# Patient Record
Sex: MALE | Race: WHITE
[De-identification: names, ages, dates, MRNs, and addresses within clinical notes are randomized per-mention and may not be internally consistent; named-entity substitution may affect disease eponyms.]

---

## 2017-06-28 ENCOUNTER — HOSPITAL ENCOUNTER (OUTPATIENT)
Dept: HOSPITAL 45 - C.PET | Age: 82
Discharge: HOME | End: 2017-06-28
Attending: PHYSICIAN ASSISTANT
Payer: OTHER GOVERNMENT

## 2017-06-28 ENCOUNTER — HOSPITAL ENCOUNTER (OUTPATIENT)
Dept: HOSPITAL 45 - C.CTS | Age: 82
Discharge: HOME | End: 2017-06-28
Attending: PHYSICIAN ASSISTANT
Payer: OTHER GOVERNMENT

## 2017-06-28 DIAGNOSIS — R91.8: ICD-10-CM

## 2017-06-28 DIAGNOSIS — K76.9: ICD-10-CM

## 2017-06-28 DIAGNOSIS — R91.1: ICD-10-CM

## 2017-06-28 DIAGNOSIS — R06.02: Primary | ICD-10-CM

## 2017-06-28 DIAGNOSIS — R91.1: Primary | ICD-10-CM

## 2017-06-28 NOTE — DIAGNOSTIC IMAGING REPORT
CT OF THE CHEST WITHOUT IV CONTRAST



CLINICAL HISTORY: Lung mass. Shortness of breath.    



COMPARISON STUDY:  CT of the abdomen and pelvis April 18, 2017. 



CT DOSE: 667.69 mGy.cm



TECHNIQUE:  Axial images of the chest were obtained without IV contrast.  Images

were reviewed in the axial, sagittal, and coronal planes. IV contrast was not

administered for this examination.



FINDINGS:  No enlarged axillary, mediastinal or hilar lymph nodes are present.

The size of the heart is normal. There is no pericardial effusion. Note is made

of a lobulated 4 x 3.5 cm left lower lobe mass with several pleural tags. There

is also an indeterminate 1 cm circumscribed right upper lobe nodule on image 81

of 301. A few smaller right upper lobe nodules measure up to 4 mm. These are

indeterminate. No suspicious osseous lesions are present. There is no

consolidation. No pneumothorax or pleural effusion is present. Visualized

portions of the upper abdomen demonstrate a subtle 2.6 cm hepatic dome hypodense

lesion shown to likely reflect a hemangioma on abdominal CT of April 18, 2017.



IMPRESSION:  



1. 4 x 3.5 cm lobulated left lower lobe mass which has slightly increased in

size since CT of April 18, 2017. This is consistent with bronchogenic carcinoma.



2. No thoracic lymphadenopathy.



3. Several indeterminate right upper lobe nodules, the largest of which is a 1

cm circumscribed nodule. Evaluation with PET/CT may be of benefit.



4. 2.6 cm hepatic dome lesion which was shown to represent a hemangioma on CT of

April 18, 2017.







Electronically signed by:  Silver Gordon M.D.

6/28/2017 8:05 AM



Dictated Date/Time:  6/28/2017 7:50 AM

## 2017-06-29 NOTE — DIAGNOSTIC IMAGING REPORT
PET/CT



CLINICAL HISTORY: Pulmonary nodule.    



TECHNIQUE:  A PET/CT was performed from the skull base through the upper thighs

following intravenous injection of F 18 FDG IV. The injection was performed at

12:15 PM on June 28, 2017 and imaging began at 1:00 PM on June 28, 2017.

Unenhanced CT was performed for attenuation correction purposes and anatomic

localization.



COMPARISON STUDY:  CT of the abdomen and pelvis April 18, 2017 and chest CT

performed earlier today.



FINDINGS: 



Head and neck: No suspicious FDG uptake is identified within the neck. There is

no cervical lymphadenopathy.



Chest: There is marked FDG uptake within the lobulated 4 x 3.5 cm left lower

lobe mass. The SUV max for this mass is 8.4. There is moderate FDG uptake within

the circumscribed 1 cm right upper lobe nodule which is shown on image 65. The

SUV max for this nodule is 4.4. A few additional smaller right upper lobe

nodules are indeterminate and too small to evaluate by PET imaging. No

suspicious josé FDG uptake is identified within the chest.



Abdomen and Pelvis: No suspicious FDG uptake is identified within the abdomen or

the pelvis. There is no abdominal or pelvic lymphadenopathy. Prostate is

moderately enlarged.



Musculoskeletal: No suspicious skeletal FDG uptake is identified.



IMPRESSION:  



1. Marked FDG uptake within the 4 x 3.5 cm left lower lobe mass consistent with

bronchogenic carcinoma.



2. Moderate FDG uptake within a circumscribed 1 cm right upper lobe nodule. This

nodule is indeterminate although suspicious for a neoplastic process given the

degree of FDG uptake. This could reflect a metastasis or primary pulmonary

lesion.



3. No evidence of josé involvement.







Electronically signed by:  Silver Gordon M.D.

6/29/2017 5:22 PM



Dictated Date/Time:  6/28/2017 2:14 PM

## 2017-07-26 ENCOUNTER — HOSPITAL ENCOUNTER (OUTPATIENT)
Dept: HOSPITAL 45 - C.ACU | Age: 82
Discharge: HOME | End: 2017-07-26
Attending: INTERNAL MEDICINE
Payer: OTHER GOVERNMENT

## 2017-07-26 VITALS
OXYGEN SATURATION: 95 % | HEART RATE: 71 BPM | TEMPERATURE: 97.34 F | DIASTOLIC BLOOD PRESSURE: 74 MMHG | SYSTOLIC BLOOD PRESSURE: 141 MMHG

## 2017-07-26 VITALS
WEIGHT: 224.87 LBS | WEIGHT: 224.87 LBS | HEIGHT: 70.98 IN | HEIGHT: 70.98 IN | BODY MASS INDEX: 31.48 KG/M2 | BODY MASS INDEX: 31.48 KG/M2 | BODY MASS INDEX: 31.48 KG/M2

## 2017-07-26 VITALS
DIASTOLIC BLOOD PRESSURE: 68 MMHG | HEART RATE: 76 BPM | SYSTOLIC BLOOD PRESSURE: 118 MMHG | OXYGEN SATURATION: 94 % | TEMPERATURE: 97.34 F

## 2017-07-26 VITALS
TEMPERATURE: 97.88 F | DIASTOLIC BLOOD PRESSURE: 63 MMHG | OXYGEN SATURATION: 94 % | SYSTOLIC BLOOD PRESSURE: 125 MMHG | HEART RATE: 91 BPM

## 2017-07-26 VITALS — OXYGEN SATURATION: 93 % | DIASTOLIC BLOOD PRESSURE: 70 MMHG | HEART RATE: 69 BPM | SYSTOLIC BLOOD PRESSURE: 128 MMHG

## 2017-07-26 VITALS
OXYGEN SATURATION: 94 % | TEMPERATURE: 97.88 F | SYSTOLIC BLOOD PRESSURE: 131 MMHG | HEART RATE: 68 BPM | DIASTOLIC BLOOD PRESSURE: 63 MMHG

## 2017-07-26 DIAGNOSIS — Z80.1: ICD-10-CM

## 2017-07-26 DIAGNOSIS — R91.1: Primary | ICD-10-CM

## 2017-07-26 DIAGNOSIS — Z87.891: ICD-10-CM

## 2017-07-26 DIAGNOSIS — E66.9: ICD-10-CM

## 2017-07-26 DIAGNOSIS — Z82.49: ICD-10-CM

## 2017-07-26 NOTE — DIAGNOSTIC IMAGING REPORT
SINGLE VIEW CHEST



CLINICAL HISTORY:  Status post navigational bronchoscopy.



FINDINGS: An AP, portable, upright chest radiograph is correlated with chest CT

dated 6/28/2017. The examination is degraded by portable technique and patient

rotation.  The heart is mildly enlarged. The mediastinal contour is within

normal limits. Interstitial thickening and nodularity are similar to previous. A

left basilar opacity likely corresponds to the mass lesion seen on the 6/28/2017

CT scan. Linear atelectasis is present the left lung base. No pleural effusion

or pneumothorax is seen. The skeletal structures are osteopenic. The bony thorax

is grossly intact.



IMPRESSION:



1. No pneumothorax is identified post procedure.



2. A left basilar opacity corresponds to the mass lesion seen by CT on

6/28/2017.



3. Smaller right-sided pulmonary nodules seen on the prior CT scan were not well

visualized by x-ray. 



4. Mild cardiomegaly.







Electronically signed by:  Bacilio Zapata M.D.

7/26/2017 1:55 PM



Dictated Date/Time:  7/26/2017 1:52 PM

## 2017-07-26 NOTE — HISTORY AND PHYSICAL
History & Physical


Date


Jul 26, 2017.





Chief Complaint


CC: Lung Mass





History of Present Illness


The patient is a 81 year old male with complaints of Lung Mass








This patient is an [id number] 81-year-old gentleman with pulmonary 

mass with a high risk for primary lung carcinoma from his history of tobacco 

exposure as well as asbestos. At this time he has PET avid left lower lobe 

nodule at greatest dimension of 4 centimeters with an SUV of 8.4. He also has 

complicating the matter a 1.1 centimeter right upper lobe nodule with minimal 

PET avidity. The mediastinum showed no signs of PET avidity via the PET scan. 

The patient will require EBUS/ENB evaluation for further workup. The patient 

and his daughter both agree this is in the best interest of the patient.





CT Non-contrast Chest (6/28/17)


    RUL multiple nodules largest 11cm


   LLL 4.3 x 3.5cm mass


   2.6cm hepatic lesion possible hemandioma


PET/CT (6/28/17)


   LLL 4 x 3.5cm mass SUV: 8.4


   RUL 1cm SUV: 4.4





Additional History


Hepatic Disease:  No


Endocrine Disorder:  No


Kidney Disease:  No


Hypertension:  No


Heart Disease:  No


Bleeding Tendencies:  No


Infectious Diseases:  No





Allergies


Coded Allergies:  


     No Known Allergies (Unverified , 7/26/17)





Home Medications


Scheduled


Dutasteride (Avodart), 0.5 MG PO QAM


Lisinopril (Zestril), 40 MG PO QAM


Simvastatin (Zocor), 80 MG PO QAM


Tamsulosin HCl (Tamsulosin HCl), 1 CAP PO QPM


Terazosin (Hytrin), 5 MG PO HS





Scheduled PRN


Naproxen Sodium (Aleve), 220 MG PO Q8 PRN for Pain





Physical Examination


Skin:  warm/dry, no rash


Eyes:  normal inspection, EOMI, sclerae normal


ENT:  normal ENT inspection, pharynx normal


Head:  normocephalic, atraumatic


Neck:  supple, no adenopathy, trachea midline


Respiratory/Chest:  lungs clear, normal breath sounds, no respiratory distress


Cardiovascular:  regular rate, rhythm, no edema, no murmur


Abdomen / GI:  normal bowel sounds, non tender


Back:  normal inspection


Extremities:  normal inspection, normal range of motion


Neurologic/Psych:  no motor/sensory deficits, alert, normal reflexes, oriented 

x 3





Diagnosis


Lung mass


ASA Classification:  ASA Class III





Plan of Treatment


The patient is to undergo overture navigational bronchoscopy as well as 

endobronchial ultrasound for evaluation of his lung mass, mediastinum and small 

right upper lobe nodule.

## 2017-07-26 NOTE — ANESTHESIOLOGY PROGRESS NOTE
Anesthesia Post Op Note


Date & Time


Jul 26, 2017 at 14:31





Vital Signs


Pain Intensity:  0





Vital Signs Past 12 Hours








  Date Time  Temp Pulse Resp B/P (MAP) Pulse Ox O2 Delivery O2 Flow Rate FiO2


 


7/26/17 14:15 36.6 68 18 131/63 94 Room Air  


 


7/26/17 13:35 36.3 76 18 118/68 94 Room Air  


 


7/26/17 13:05  69 18 128/70 93 Room Air  


 


7/26/17 12:35 36.3 71 18 141/74 95 Room Air  


 


7/26/17 12:26 36.5  16 139/78    


 


7/26/17 12:21  76 13     


 


7/26/17 12:21  76 13 150/75 98   


 


7/26/17 12:16  76 13 151/72 98   


 


7/26/17 12:16  76 13     


 


7/26/17 12:11  77 12 128/79 98   


 


7/26/17 12:11  77 12     


 


7/26/17 12:06  81 14     


 


7/26/17 12:06  82 14 131/83 92   


 


7/26/17 12:01  81 13 141/67 92   


 


7/26/17 12:01  81 13     


 


7/26/17 11:58    141/63    


 


7/26/17 11:51 36.2 85 16 152/67 98 Mask 7 


 


7/26/17 08:03 36.6 91 20 125/63 (83) 94 Room Air  











Notes


Mental Status:  alert / awake / arousable, participated in evaluation


Pt Amnestic to Procedure:  Yes


Nausea / Vomiting:  adequately controlled


Pain:  adequately controlled


Airway Patency, RR, SpO2:  stable & adequate


BP & HR:  stable & adequate


Hydration State:  stable & adequate


Anesthetic Complications:  no major complications apparent

## 2017-07-26 NOTE — DISCHARGE INSTRUCTIONS
Discharge Instructions


Date of Service


Jul 26, 2017.





Admission


Reason for Admission:  Lung Nodule, Lung Mass, Sob





Discharge


Discharge Diagnosis / Problem:  lung mass





Discharge Goals


Goal(s):  Diagnostic testing





Activity Recommendations


Activity Limitations:  resume your previous activity





.





Instructions / Follow-Up


Instructions / Follow-Up


Follow-up in the pulmonary clinic with Dr. Mcgarry





[facility] Diet


Patient's current hospital diet:





Discharge Diet


Recommended Diet:  Regular Diet





Procedures


Procedures Performed:  


Electromagnetic Navigational Bronchoscopy;





Endobronchial Ultrasound Transbronchial Biopsy, Trans-Tracheal/Bronchial biospy

, 


Bronchial Lavage





Pending Studies


Studies pending at discharge:  no





Medical Emergencies








.


Who to Call and When:





Medical Emergencies:  If at any time you feel your situation is an emergency, 

please call 911 immediately.





.





Non-Emergent Contact


Non-Emergency issues call your:  Pulmonologist


Call Non-Emergent contact if:  temperature is above 101.5





.


.








"Provider Documentation" section prepared by Von Mcgarry.








.





VTE Core Measure


Inpt VTE Proph given/why not?:  Treatment not indicated

## 2017-07-26 NOTE — DIAGNOSTIC IMAGING REPORT
INTRAOPERATIVE RADIOGRAPH



CLINICAL HISTORY: Bilateral navigational bronchoscopy.



Fluoroscopy time: 252 seconds.



FINDINGS: 2 spot fluoroscopic views of the chest are presented. Correlation is

made with chest CT dated 6/28/2017. On the first image the bronchoscope projects

over the left lung on the second image the bronchoscope projects over the right

lung.



IMPRESSION: Intraoperative images from bilateral navigational bronchoscopy. See

operative report for detailed findings.







Electronically signed by:  Bacilio Zapata M.D.

7/26/2017 12:29 PM



Dictated Date/Time:  7/26/2017 12:27 PM

## 2017-07-26 NOTE — BRONCHOSCOPY PROCEDURE NOTE
Bronchoscopy Procedure Note


Procedure: Flexible-Bronchoscopy, EBUS, Tbbx, GETA 


Consent: Obtained through the patient placed into the chart


Preprocedural diagnosis:  Pulmonary mass


Postprocedural diagnosis: Pulmonary mass


Analgesia:


   GETA


Sedation:


   GETA


Procedure:


The Olympus video bronchoscope and EBUS scope were used for this procedure 


Initially the flexible bronchoscope was used for evaluation of the airways.


The ET tube was notably   5 cm above the level of the estuardo.


Trachea: Visualized portion of the trachea was anatomically within normal limits


Estuardo: Anatomically within normal limits


Right bronchial tree:


   Right mainstem bronchus: Anatomically within normal limits


   Right upper lobe: Anatomically within normal limits


   Bronchus intermedius: Anatomically within normal limits


   Right middle lobe: Anatomically within normal limits


   Right lower lobe: Anatomically within normal limits


   Findings: No significant findings noted


Left bronchial tree:


   Left mainstem bronchus: Anatomically within normal limits


   Left upper lobe: Anatomically within normal limits


   Lingula: Anatomically within normal limits


   Left lower lobe: Anatomically within normal limits


   Findings: No significant findings noted


EBUS/MELVINA:


   Tbbx Oscar Stations:


   7:  # of passes 3 


   4R: # of passes  passes 3 


   4L: # of passes 3


   10R: # of passes 3


   10L: # of passes 3


   11R: # of passes 3


   11L: # of passes 3


    


ENB:


   LLL: FNA, Tbbx, BAL, Cytology brushing


   RUL: FNA, Tbbx, BAL, Cytology brushing





 EBL:


   none





Complications:


   None





Follow-up: Patient is to be removed to PACU and followed

## 2017-09-06 ENCOUNTER — HOSPITAL ENCOUNTER (INPATIENT)
Dept: HOSPITAL 45 - C.ACU | Age: 82
LOS: 4 days | Discharge: HOME HEALTH | DRG: 165 | End: 2017-09-10
Attending: THORACIC SURGERY (CARDIOTHORACIC VASCULAR SURGERY) | Admitting: THORACIC SURGERY (CARDIOTHORACIC VASCULAR SURGERY)
Payer: COMMERCIAL

## 2017-09-06 VITALS
WEIGHT: 224.47 LBS | BODY MASS INDEX: 31.43 KG/M2 | BODY MASS INDEX: 31.43 KG/M2 | WEIGHT: 224.47 LBS | HEIGHT: 71 IN | HEIGHT: 71 IN | BODY MASS INDEX: 31.43 KG/M2

## 2017-09-06 VITALS
DIASTOLIC BLOOD PRESSURE: 90 MMHG | TEMPERATURE: 97.34 F | HEART RATE: 78 BPM | SYSTOLIC BLOOD PRESSURE: 150 MMHG | OXYGEN SATURATION: 96 %

## 2017-09-06 VITALS
TEMPERATURE: 97.52 F | HEART RATE: 81 BPM | SYSTOLIC BLOOD PRESSURE: 133 MMHG | DIASTOLIC BLOOD PRESSURE: 81 MMHG | OXYGEN SATURATION: 96 %

## 2017-09-06 VITALS — OXYGEN SATURATION: 94 % | DIASTOLIC BLOOD PRESSURE: 77 MMHG | SYSTOLIC BLOOD PRESSURE: 143 MMHG | HEART RATE: 78 BPM

## 2017-09-06 VITALS
SYSTOLIC BLOOD PRESSURE: 147 MMHG | DIASTOLIC BLOOD PRESSURE: 70 MMHG | OXYGEN SATURATION: 97 % | HEART RATE: 64 BPM | TEMPERATURE: 97.52 F

## 2017-09-06 VITALS — SYSTOLIC BLOOD PRESSURE: 134 MMHG | HEART RATE: 68 BPM | OXYGEN SATURATION: 95 % | DIASTOLIC BLOOD PRESSURE: 76 MMHG

## 2017-09-06 VITALS
DIASTOLIC BLOOD PRESSURE: 77 MMHG | OXYGEN SATURATION: 96 % | TEMPERATURE: 97.7 F | SYSTOLIC BLOOD PRESSURE: 133 MMHG | HEART RATE: 72 BPM

## 2017-09-06 VITALS
OXYGEN SATURATION: 97 % | SYSTOLIC BLOOD PRESSURE: 126 MMHG | HEART RATE: 88 BPM | DIASTOLIC BLOOD PRESSURE: 74 MMHG | TEMPERATURE: 97.52 F

## 2017-09-06 VITALS
OXYGEN SATURATION: 97 % | TEMPERATURE: 97.52 F | HEART RATE: 79 BPM | DIASTOLIC BLOOD PRESSURE: 70 MMHG | SYSTOLIC BLOOD PRESSURE: 120 MMHG

## 2017-09-06 VITALS
SYSTOLIC BLOOD PRESSURE: 124 MMHG | OXYGEN SATURATION: 96 % | HEART RATE: 77 BPM | TEMPERATURE: 97.34 F | DIASTOLIC BLOOD PRESSURE: 74 MMHG

## 2017-09-06 DIAGNOSIS — R33.9: ICD-10-CM

## 2017-09-06 DIAGNOSIS — Z87.891: ICD-10-CM

## 2017-09-06 DIAGNOSIS — J06.9: ICD-10-CM

## 2017-09-06 DIAGNOSIS — Z80.1: ICD-10-CM

## 2017-09-06 DIAGNOSIS — C34.32: Primary | ICD-10-CM

## 2017-09-06 PROCEDURE — 0BTJ4ZZ RESECTION OF LEFT LOWER LUNG LOBE, PERCUTANEOUS ENDOSCOPIC APPROACH: ICD-10-PCS | Performed by: THORACIC SURGERY (CARDIOTHORACIC VASCULAR SURGERY)

## 2017-09-06 PROCEDURE — 07B74ZX EXCISION OF THORAX LYMPHATIC, PERCUTANEOUS ENDOSCOPIC APPROACH, DIAGNOSTIC: ICD-10-PCS | Performed by: THORACIC SURGERY (CARDIOTHORACIC VASCULAR SURGERY)

## 2017-09-06 PROCEDURE — 0B9C8ZX DRAINAGE OF RIGHT UPPER LUNG LOBE, VIA NATURAL OR ARTIFICIAL OPENING ENDOSCOPIC, DIAGNOSTIC: ICD-10-PCS | Performed by: THORACIC SURGERY (CARDIOTHORACIC VASCULAR SURGERY)

## 2017-09-06 RX ADMIN — ACETAMINOPHEN SCH MLS/HR: 10 INJECTION, SOLUTION INTRAVENOUS at 13:51

## 2017-09-06 RX ADMIN — CLINDAMYCIN SCH MLS/HR: 150 INJECTION, SOLUTION INTRAMUSCULAR; INTRAVENOUS at 14:14

## 2017-09-06 RX ADMIN — DEXTROSE AND SODIUM CHLORIDE SCH MLS/HR: 5; .45 INJECTION, SOLUTION INTRAVENOUS at 21:21

## 2017-09-06 RX ADMIN — ACETAMINOPHEN SCH MLS/HR: 10 INJECTION, SOLUTION INTRAVENOUS at 21:53

## 2017-09-06 RX ADMIN — DOCUSATE SODIUM SCH MG: 100 CAPSULE, LIQUID FILLED ORAL at 21:21

## 2017-09-06 RX ADMIN — OXYCODONE HYDROCHLORIDE PRN MG: 5 TABLET ORAL at 13:13

## 2017-09-06 RX ADMIN — DEXTROSE AND SODIUM CHLORIDE SCH MLS/HR: 5; .45 INJECTION, SOLUTION INTRAVENOUS at 13:14

## 2017-09-06 RX ADMIN — METOCLOPRAMIDE SCH MG: 5 INJECTION, SOLUTION INTRAMUSCULAR; INTRAVENOUS at 14:09

## 2017-09-06 RX ADMIN — RANITIDINE SCH MG: 150 TABLET ORAL at 21:21

## 2017-09-06 RX ADMIN — FINASTERIDE SCH MG: 5 TABLET, FILM COATED ORAL at 21:21

## 2017-09-06 RX ADMIN — KETOROLAC TROMETHAMINE SCH MG: 15 INJECTION INTRAMUSCULAR; INTRAVENOUS at 21:54

## 2017-09-06 RX ADMIN — METOCLOPRAMIDE SCH MG: 5 INJECTION, SOLUTION INTRAMUSCULAR; INTRAVENOUS at 21:53

## 2017-09-06 RX ADMIN — TAMSULOSIN HYDROCHLORIDE SCH MG: 0.4 CAPSULE ORAL at 21:21

## 2017-09-06 RX ADMIN — KETOROLAC TROMETHAMINE SCH MG: 15 INJECTION INTRAMUSCULAR; INTRAVENOUS at 14:08

## 2017-09-06 RX ADMIN — CLINDAMYCIN SCH MLS/HR: 150 INJECTION, SOLUTION INTRAMUSCULAR; INTRAVENOUS at 20:01

## 2017-09-06 NOTE — DIAGNOSTIC IMAGING REPORT
CHEST ONE VIEW PORTABLE



HISTORY:      Left lower lobectomy. Postop.



COMPARISON: Chest 9/6/2017. 



FINDINGS: There is a tiny left apical pneumothorax with a pleural gap of 2 mm.

Left chest tube terminates in the left upper lung zone. Small amount of left

chest wall subcutaneous emphysema. Left basilar linear densities favor

subsegmental atelectasis. There are 2 metallic fiducial markers within the right

upper lobe adjacent to the 1 cm upper lobe nodule. The heart is stable in size.



IMPRESSION:

Tiny left pneumothorax. Left chest tube appears to be in good position.







Electronically signed by:  Nikolay Macario M.D.

9/6/2017 12:08 PM



Dictated Date/Time:  9/6/2017 12:07 PM

## 2017-09-06 NOTE — OPERATIVE REPORT
DATE OF OPERATION:  09/06/2017

 

PREOPERATIVE DIAGNOSIS:  Hypermetabolic mass, left lower lobe.

 

POSTOPERATIVE DIAGNOSIS:  Nonsmall cell lung carcinoma, left lower lobe.

 

PROCEDURES:

1.  Thoracoscopic left lower lobectomy.

2.  Mediastinal lymphadenectomy.

 

SURGEON:  Dr. Bae.

 

ASSISTANT:  KEENAN Hamilton.

 

ANESTHESIA:  General anesthesia endotracheal intubation using double lumen

tube.

 

SPECIFICS OF PROCEDURE:  This is an interesting 82-year-old man who is fairly

well preserved.  Does have a history of cigarette smoking, was found to have

2 hypermetabolic masses.  He is worked up by Dr. Von Mcgarry. 

Unfortunately, even after electromagnetic navigational bronchoscopy, we do

not have a diagnosis; however, the endobronchial ultrasound shows mediastinum

was apparently clean.  The metastases in the PET scan did not light up

anywhere else.  After a long discussion and presentation at our cancer

conference, we elected to proceed with a marking of the right upper lobe

lesion with an electromagnetic navigational bronchoscopy and fiducial

markers.  I did that before the thoracoscopic lobectomy, which was also

recommended.

 

On 09/06/2017, patient underwent an uncomplicated left lower lobectomy

thoracoscopically.  We had negligible blood loss and no air leak at

conclusion of the case.  He tolerated it quite well.  Frozen section showed

negative bronchial margins.  However, this does appear to be a nonsmall cell

lung carcinoma and may well be a squamous cell carcinoma.  It was involving

the pleura, but was not involving the parietal pleura.  We did a complete

lymph node dissection.

 

PROCEDURE IN DETAIL:  The patient brought to the operating room and laid in

supine position.  General anesthesia induced and endotracheal intubation was

performed with a double lumen tube after his navigational bronchoscopy and

fiducial markers had been placed.  The patient was then turned in a right

lateral decubitus position, his left chest prepped and draped in the usual

sterile fashion.  The main incision, one interspace below the tip of the

scapula, bit anterior and upon entering the pleural cavity, could be seen and

there was no evidence of any adhesions.  A 12 mm port was placed.  A 10 mm

30-degree scope was placed.  We really saw very little in the way of

adhesions and he had fairly well-developed fissures.  Another port was placed

at about the 7th interspace anteriorly and then another port was placed at

about the 4th interspace, just anterior to the scapula.  I then took down the

inferior pulmonary ligaments and biopsied the level 8 and level 9 nodes.  I

then was able to free up the vein without difficulty.  Upon going up

posterior to the hilum, I then biopsied level 11 and level 10 node and in

fact went all the way down and biopsied the level 7 node.  The attention was

then turned anteriorly.  I dissected out the fissure until we came to the

artery.  There were 3 branches that needed to be taken.  I dissected this out

anteriorly until we came down in front of the bronchus and then I was able to

get around this and used a stapler to complete our fissure anteriorly.  With

the use of the cautery, we were able to complete the fissure posteriorly.  I

then was able to dissect out the arteries away from the bronchus and we fired

staplers x3 to complete these.  I did identify the lingual artery and the

lingual bronchus.  I then fired a stapler across the bronchus, just below

this.  This left just the vein which was divided with an Endo-SHON stapler. 

We then delivered this mass off the field in an Endobag through the superior

interspace.  I then irrigated out the chest.  We really had very little in

the way of an air leak.  The 266 mg of Exparel was mixed with 80 mL of normal

saline total and then I performed an intrathoracic block from the 2nd to the

11th rib.  The 24-Albanian chest tube was then directed towards the apex to the

anterior thoracoscopy port and sutured in place with heavy silk suture.  0

Vicryl was used to close the muscle areas of both ports.  The 4-0 Monocryl

was then used to close the skin of all the incisions.  There was some air

leak at conclusion of the case.  He was extubated without difficulty in the

room.  Blood loss was negligible.

 

 

I attest to the content of the Intraoperative Record and any orders documented therein. Any exception
s are noted below.

## 2017-09-06 NOTE — ANESTHESIOLOGY PROGRESS NOTE
Anesthesia Post Op Note


Date & Time


Sep 6, 2017 at 12:35





Vital Signs


Pain Intensity:  0





Vital Signs Past 12 Hours








  Date Time  Temp Pulse Resp B/P (MAP) Pulse Ox O2 Delivery O2 Flow Rate FiO2


 


9/6/17 12:25  72 14 134/72 94 Nasal Cannula 2 


 


9/6/17 12:15  71 13 134/76 96 Nasal Cannula 2 


 


9/6/17 12:05 36.2 78 17 133/77 96 Nasal Cannula 2 


 


9/6/17 11:55  73 12 132/76 95 Nasal Cannula 2 


 


9/6/17 11:45  75 11 132/74 99 Oxymask 6 


 


9/6/17 11:35  82 23 122/70 99 Oxymask 10 


 


9/6/17 11:28 36.4 86 12 131/69 99 Mask 10 


 


9/6/17 05:50 36.4 64 18 147/70 97 Room Air  











Notes


Mental Status:  alert / awake / arousable, participated in evaluation


Pt Amnestic to Procedure:  Yes


Nausea / Vomiting:  adequately controlled


Pain:  adequately controlled


Airway Patency, RR, SpO2:  stable & adequate


BP & HR:  stable & adequate


Hydration State:  stable & adequate


Anesthetic Complications:  no major complications apparent

## 2017-09-06 NOTE — OPERATIVE REPORT
DATE OF OPERATION:  09/06/2017

 

PREOPERATIVE DIAGNOSES:

1.  Hypermetabolic small nodule, right upper lobe.

2.  Large hypermetabolic mass, left lower lobe.

 

POSTOPERATIVE DIAGNOSES:  Same.

 

PROCEDURE:  Electromagnetic navigational bronchoscopy with marking of right

upper lobe lesion with fiducial markers x2.

 

SURGEON:  Dr. Bae.

 

ASSISTANT:  KEENAN Hamilton.

 

ANESTHESIA:  General anesthesia endotracheal intubation.

 

INDICATION FOR PROCEDURE AND FINDINGS:  Mr. Stover is an 82-year-old fairly

well preserved male who was found to have 2 masses in his lungs.  He

underwent an endobronchial ultrasound and a navigational bronchoscopy by Dr. Mcgarry and nodes appeared to be negative and we did not get a diagnosis,

but both of these lesions are most likely carcinomas.  We elected to proceed

with a resection of the left lower lobe mass which was large; however, we

felt that with a fiducial marker, we could vanessa the right upper lobe and

offer sterotactic ablative therapy (SBRT).  On 09/06/2017, the patient was

brought to the operating room and underwent an uncomplicated electromagnetic

navigational bronchoscopy.  I did not biopsy this as I was preparing to do a

thoracoscopic left lower lobectomy, did not want cause the possible

pneumothorax as he would be on one lung ventilation.  For this reason, we did

use an ultrasound and found that we were right in the mass.  I placed 2

fiducial markers about a centimeter away from each other.  This was done

under fluoroscopic guidance as well as using the radial ultrasound probe. 

This worked up very well.  He was then ready for his thoracoscopic left lower

lobectomy.

 

PROCEDURE:  The patient brought to the operating room and laid in supine

position.  General anesthesia induced and endotracheal intubation was

performed.  The down to single lumen tube.  The patient's airways had been

mapped by the Aquacue navigational system and his CT scan.  I then

placed the fiberoptic bronchoscope to the adaptor to the single lumen

endotracheal tube and registered his airway probably down to the tertiary

airways.  We then began our navigation and went into the upper lobe and I was

able to get very close to this mass.  I was about 1 cm away and then using

the radial ultrasound probe, it appeared that about a cm away from the tip

thoracoscopically we saw a change that appeared to be a mass of the

ultrasound.  A fiducial marker was left here and then about a cm superior. 

We then did washings of this area for cytology.  I then removed the

navigational probe and with the use of fiberoptic bronchoscope it could be

seen that we saw no evidence of bleeding.  We then prepared to switch him

over to a double lumen tube for his thoracoscopic lobectomy.  He tolerated it

quite well.

 

 

I attest to the content of the Intraoperative Record and any orders documented therein. Any exception
s are noted below.

## 2017-09-06 NOTE — DIAGNOSTIC IMAGING REPORT
CHEST 1 VIEW FRONTAL



CLINICAL HISTORY: NAVIGATIONAL BRONCHSCOPY   



COMPARISON STUDY:  Chest 7/20/1617.



FINDINGS: Total fluoroscopy time was 1 minute and 27 seconds. A single

fluoroscopic spot image of the right upper chest was submitted. There is a

bronchoscope seen within the right upper lobe bronchus with placement of 2

metallic fiducial markers adjacent to the right upper lobe nodule.



IMPRESSION:  Fluoroscopy provided for bronchoscopy. 









Electronically signed by:  Nikolay Macario M.D.

9/6/2017 9:01 AM



Dictated Date/Time:  9/6/2017 8:57 AM

## 2017-09-07 VITALS
HEART RATE: 59 BPM | SYSTOLIC BLOOD PRESSURE: 126 MMHG | DIASTOLIC BLOOD PRESSURE: 69 MMHG | OXYGEN SATURATION: 93 % | TEMPERATURE: 97.7 F

## 2017-09-07 VITALS
HEART RATE: 71 BPM | TEMPERATURE: 97.7 F | OXYGEN SATURATION: 95 % | DIASTOLIC BLOOD PRESSURE: 67 MMHG | SYSTOLIC BLOOD PRESSURE: 121 MMHG

## 2017-09-07 VITALS
DIASTOLIC BLOOD PRESSURE: 76 MMHG | TEMPERATURE: 97.7 F | OXYGEN SATURATION: 94 % | SYSTOLIC BLOOD PRESSURE: 150 MMHG | HEART RATE: 62 BPM

## 2017-09-07 VITALS
SYSTOLIC BLOOD PRESSURE: 107 MMHG | TEMPERATURE: 97.7 F | HEART RATE: 62 BPM | DIASTOLIC BLOOD PRESSURE: 64 MMHG | OXYGEN SATURATION: 95 %

## 2017-09-07 VITALS
SYSTOLIC BLOOD PRESSURE: 116 MMHG | OXYGEN SATURATION: 95 % | HEART RATE: 66 BPM | TEMPERATURE: 97.34 F | DIASTOLIC BLOOD PRESSURE: 65 MMHG

## 2017-09-07 VITALS
SYSTOLIC BLOOD PRESSURE: 147 MMHG | TEMPERATURE: 97.52 F | DIASTOLIC BLOOD PRESSURE: 69 MMHG | OXYGEN SATURATION: 94 % | HEART RATE: 61 BPM

## 2017-09-07 VITALS — OXYGEN SATURATION: 95 %

## 2017-09-07 LAB
ANION GAP SERPL CALC-SCNC: 6 MMOL/L (ref 3–11)
BASOPHILS # BLD: 0.01 K/UL (ref 0–0.2)
BASOPHILS NFR BLD: 0.1 %
BUN SERPL-MCNC: 20 MG/DL (ref 7–18)
BUN/CREAT SERPL: 20 (ref 10–20)
CALCIUM SERPL-MCNC: 7.9 MG/DL (ref 8.5–10.1)
CHLORIDE SERPL-SCNC: 107 MMOL/L (ref 98–107)
CO2 SERPL-SCNC: 25 MMOL/L (ref 21–32)
COMPLETE: YES
CREAT CL PREDICTED SERPL C-G-VRATE: 69.2 ML/MIN
CREAT SERPL-MCNC: 1 MG/DL (ref 0.6–1.4)
EOSINOPHIL NFR BLD AUTO: 150 K/UL (ref 130–400)
GLUCOSE SERPL-MCNC: 126 MG/DL (ref 70–99)
HCT VFR BLD CALC: 36.7 % (ref 42–52)
IG%: 0.9 %
IMM GRANULOCYTES NFR BLD AUTO: 15.7 %
INR PPP: 1.1 (ref 0.9–1.1)
LYMPHOCYTES # BLD: 2.27 K/UL (ref 1.2–3.4)
MCH RBC QN AUTO: 30.5 PG (ref 25–34)
MCHC RBC AUTO-ENTMCNC: 34.9 G/DL (ref 32–36)
MCV RBC AUTO: 87.6 FL (ref 80–100)
MONOCYTES NFR BLD: 6.5 %
NEUTROPHILS # BLD AUTO: 0.3 %
NEUTROPHILS NFR BLD AUTO: 76.5 %
PMV BLD AUTO: 9.6 FL (ref 7.4–10.4)
POTASSIUM SERPL-SCNC: 4.2 MMOL/L (ref 3.5–5.1)
PROTHROMBIN TIME: 11.5 SECONDS (ref 9–12)
RBC # BLD AUTO: 4.19 M/UL (ref 4.7–6.1)
SODIUM SERPL-SCNC: 138 MMOL/L (ref 136–145)
WBC # BLD AUTO: 14.45 K/UL (ref 4.8–10.8)

## 2017-09-07 RX ADMIN — DEXTROSE AND SODIUM CHLORIDE SCH MLS/HR: 5; .45 INJECTION, SOLUTION INTRAVENOUS at 05:14

## 2017-09-07 RX ADMIN — METOCLOPRAMIDE SCH MG: 5 INJECTION, SOLUTION INTRAMUSCULAR; INTRAVENOUS at 05:14

## 2017-09-07 RX ADMIN — OXYCODONE HYDROCHLORIDE PRN MG: 5 TABLET ORAL at 18:40

## 2017-09-07 RX ADMIN — RANITIDINE SCH MG: 150 TABLET ORAL at 08:37

## 2017-09-07 RX ADMIN — SIMVASTATIN SCH MG: 40 TABLET, FILM COATED ORAL at 08:37

## 2017-09-07 RX ADMIN — ENOXAPARIN SODIUM SCH MG: 40 INJECTION SUBCUTANEOUS at 08:38

## 2017-09-07 RX ADMIN — KETOROLAC TROMETHAMINE SCH MG: 15 INJECTION INTRAMUSCULAR; INTRAVENOUS at 21:51

## 2017-09-07 RX ADMIN — DOCUSATE SODIUM SCH MG: 100 CAPSULE, LIQUID FILLED ORAL at 08:37

## 2017-09-07 RX ADMIN — LISINOPRIL SCH MG: 40 TABLET ORAL at 08:37

## 2017-09-07 RX ADMIN — DOCUSATE SODIUM SCH MG: 100 CAPSULE, LIQUID FILLED ORAL at 20:42

## 2017-09-07 RX ADMIN — ACETAMINOPHEN SCH MG: 325 TABLET ORAL at 18:40

## 2017-09-07 RX ADMIN — FINASTERIDE SCH MG: 5 TABLET, FILM COATED ORAL at 20:42

## 2017-09-07 RX ADMIN — KETOROLAC TROMETHAMINE SCH MG: 15 INJECTION INTRAMUSCULAR; INTRAVENOUS at 05:14

## 2017-09-07 RX ADMIN — ACETAMINOPHEN SCH MLS/HR: 10 INJECTION, SOLUTION INTRAVENOUS at 05:14

## 2017-09-07 RX ADMIN — ACETAMINOPHEN SCH MG: 325 TABLET ORAL at 12:01

## 2017-09-07 RX ADMIN — KETOROLAC TROMETHAMINE SCH MG: 15 INJECTION INTRAMUSCULAR; INTRAVENOUS at 13:40

## 2017-09-07 RX ADMIN — OXYCODONE HYDROCHLORIDE PRN MG: 5 TABLET ORAL at 08:37

## 2017-09-07 RX ADMIN — TAMSULOSIN HYDROCHLORIDE SCH MG: 0.4 CAPSULE ORAL at 20:42

## 2017-09-07 RX ADMIN — RANITIDINE SCH MG: 150 TABLET ORAL at 20:42

## 2017-09-07 NOTE — SURGERY PROGRESS NOTE
DATE: 09/07/2017

 

DATE:  09/07/2017  

 

Roney Stover is an 82-year-old male who underwent an Electromagnetic

navigational bronchoscopy with placement of 2 fiducial markers yesterday for

a hypermetabolic but undiagnosed nodule in the right upper lobe.  I also did

a thoracoscopic left lower lobectomy with mediastinal lymphadenectomy for a

large cancer which was also undiagnosed prior to going in.  This is a

nonsmall cell lung carcinoma and appears to be a squamous cell.  Quite

frankly Mr. Stoevr did superbly.  He is on room air.  He is ambulating in the

hallway.  He is eating a regular diet.  The only issue that has arisen with Mr. Stover is in urinary retention.    He has had this problem before. 

We put a Laird catheter in him,  had it in  overnight.  We are going to pull

it out this morning and put him on Flomax.  Otherwise, his pain control is

excellent.  He has no air leak.  My plan at this point is to remove his chest

tube and discharge him in the morning.  We will see how he does over the

course of the day.

 

 

 

 

WERO

## 2017-09-07 NOTE — DIAGNOSTIC IMAGING REPORT
CHEST ONE VIEW PORTABLE



HISTORY:  82 years-old Male LLL prior left lower lobectomy. Pneumothorax

follow-up.



COMPARISON: Portable chest radiograph 9/6/2017



TECHNIQUE: Portable upright AP view of the chest



FINDINGS: 

Fiducial markers within the region of the right upper lobe are seen. Left-sided

chest tube is noted in unchanged position. Tiny left apical pneumothorax is

redemonstrated with only a few millimeters of visceral pleural separation,

unchanged. There is decreased amount of subcutaneous emphysema along the left

chest wall. Subsegmental hazy left basilar opacity is again seen suggesting

atelectasis. Right lung is clear.



Cardiac silhouette is mildly enlarged. Mild pulmonary vascular congestion

persists. Degenerative changes are seen about the bilateral shoulders.



IMPRESSION: Unchanged tiny left apical pneumothorax with decreased amount of

left chest wall subcutaneous emphysema. 







The above report was generated using voice recognition software. It may contain

grammatical, syntax or spelling errors.







Electronically signed by:  Kareem Arenas M.D.

9/7/2017 7:07 AM



Dictated Date/Time:  9/7/2017 7:05 AM

## 2017-09-07 NOTE — ANESTHESIOLOGY PROGRESS NOTE
Anesthesia Post Op Note


Date & Time


Sep 7, 2017 at 08:16





Vital Signs





Vital Signs Past 12 Hours








  Date Time  Temp Pulse Resp B/P (MAP) Pulse Ox O2 Delivery O2 Flow Rate FiO2


 


9/7/17 07:45     95 Room Air  


 


9/7/17 07:01 36.5 59 15  93 Room Air  


 


9/7/17 04:40 36.5 62 17 107/64 (78) 95 Room Air  


 


9/7/17 00:40 36.5 71 16 121/67 (85) 95 Room Air  


 


9/6/17 22:50      Room Air  


 


9/6/17 20:40 36.4 88 20 126/74 (91) 97 Nasal Cannula  











Notes


Mental Status:  alert / awake / arousable, participated in evaluation


Pt Amnestic to Procedure:  Yes


Nausea / Vomiting:  adequately controlled


Pain:  adequately controlled


Airway Patency, RR, SpO2:  stable & adequate


BP & HR:  stable & adequate


Hydration State:  stable & adequate


Anesthetic Complications:  no major complications apparent

## 2017-09-07 NOTE — CLINICAL DOCUMENTATION QUERY
Mr. INGRAMFLOR :



**** CLINICAL DOCUMENTATION QUERY****



Patient is an 82 year old male who underwent  electromagnetic navigational bronchoscopy 
with marking of right upper lobe lesion with fiducial markers x2 with subsequent 
thorascopic left lower lobectomy and mediastinal lymphadenectomy.  Postoperative chest 
radiographs demonstrate a small left pneumothorax.  As the professional  cannot 
assume these to be the normal consequence of thoracic procedures, please consider explicit 
documentation thereof so as to avoid  uncertainty at time of discharge.

 

In your clinical opinion is this patient being managed for:

    

                        ( x ) Pneumothorax, expected post thorascopic procedure, not a 
complication of care

                        (  ) Not Agree



                        (  ) Other explanation of clinical findings (Please Explain)

                        (  ) Unable to determine (Please Define)

                        (  ) Need to Discuss

                        



The medical record reflects the following clinical findings, treatment, and risk factors.  
  



Clinical Indicators: As above

Treatment: Serial chest radiographs

Risk Factors: Thorascopic surgery



Please clarify and document your clinical opinion in the progress notes and discharge 
summary. Terms such as "probable", "suspected", "likely", "questionable", "possible", or 
"still to be ruled out" are acceptable. 



*****IF IN AGREEMENT, YOU MUST DOCUMENT ABOVE DIAGNOSTIC STATEMENT IN DAILY PROGRESS NOTES 
AND DISCHARGE SUMMARY. This document is not part of the patient's record.*****

Thank You, Blade Espitia, [phone]

## 2017-09-08 VITALS
OXYGEN SATURATION: 94 % | TEMPERATURE: 98.06 F | DIASTOLIC BLOOD PRESSURE: 81 MMHG | HEART RATE: 65 BPM | SYSTOLIC BLOOD PRESSURE: 162 MMHG

## 2017-09-08 VITALS — OXYGEN SATURATION: 97 %

## 2017-09-08 VITALS
OXYGEN SATURATION: 94 % | TEMPERATURE: 97.52 F | HEART RATE: 73 BPM | DIASTOLIC BLOOD PRESSURE: 79 MMHG | SYSTOLIC BLOOD PRESSURE: 144 MMHG

## 2017-09-08 VITALS
TEMPERATURE: 98.06 F | SYSTOLIC BLOOD PRESSURE: 158 MMHG | DIASTOLIC BLOOD PRESSURE: 77 MMHG | OXYGEN SATURATION: 93 % | HEART RATE: 83 BPM

## 2017-09-08 RX ADMIN — ACETAMINOPHEN SCH MG: 325 TABLET ORAL at 05:39

## 2017-09-08 RX ADMIN — RANITIDINE SCH MG: 150 TABLET ORAL at 21:38

## 2017-09-08 RX ADMIN — TAMSULOSIN HYDROCHLORIDE SCH MG: 0.4 CAPSULE ORAL at 21:39

## 2017-09-08 RX ADMIN — ENOXAPARIN SODIUM SCH MG: 40 INJECTION SUBCUTANEOUS at 08:26

## 2017-09-08 RX ADMIN — OXYCODONE HYDROCHLORIDE PRN MG: 5 TABLET ORAL at 14:33

## 2017-09-08 RX ADMIN — KETOROLAC TROMETHAMINE SCH MG: 15 INJECTION INTRAMUSCULAR; INTRAVENOUS at 05:38

## 2017-09-08 RX ADMIN — LISINOPRIL SCH MG: 40 TABLET ORAL at 08:26

## 2017-09-08 RX ADMIN — SIMVASTATIN SCH MG: 40 TABLET, FILM COATED ORAL at 08:26

## 2017-09-08 RX ADMIN — DOCUSATE SODIUM SCH MG: 100 CAPSULE, LIQUID FILLED ORAL at 08:25

## 2017-09-08 RX ADMIN — ACETAMINOPHEN SCH MG: 325 TABLET ORAL at 18:00

## 2017-09-08 RX ADMIN — ACETAMINOPHEN SCH MG: 325 TABLET ORAL at 12:23

## 2017-09-08 RX ADMIN — ACETAMINOPHEN SCH MG: 325 TABLET ORAL at 00:00

## 2017-09-08 RX ADMIN — FINASTERIDE SCH MG: 5 TABLET, FILM COATED ORAL at 21:38

## 2017-09-08 RX ADMIN — RANITIDINE SCH MG: 150 TABLET ORAL at 08:25

## 2017-09-08 RX ADMIN — DOCUSATE SODIUM SCH MG: 100 CAPSULE, LIQUID FILLED ORAL at 21:38

## 2017-09-08 RX ADMIN — Medication SCH GM: at 10:08

## 2017-09-08 RX ADMIN — PIPERACILLIN AND TAZOBACTAM SCH MLS/HR: 3; .375 INJECTION, POWDER, LYOPHILIZED, FOR SOLUTION INTRAVENOUS; PARENTERAL at 16:19

## 2017-09-08 NOTE — DIAGNOSTIC IMAGING REPORT
CHEST ONE VIEW PORTABLE



HISTORY:  82 years-old Male chest tube removal  a follow-up study.



COMPARISON: Chest radiograph 9/8/2017 at 7:07 AM



TECHNIQUE: Portable upright AP view of the chest.



FINDINGS: 

Additional markers are noted within the region of the right upper lobe. Cardiac

silhouette is moderately enlarged. There is atherosclerosis of the aorta.

Persistent subsegmental left or the right bibasilar opacities are noted.

Decreased amount of subcutaneous emphysema of the left lateral chest wall.

Persistent small left apical pneumothorax is seen, visceropleural separation of

4 mm. Chest tube has been removed in the interval.



The bones are grossly intact.



IMPRESSION: 

1. Unchanged tiny left apical pneumothorax with interval removal of left-sided

chest tube.

2. Stable subsegmental bibasilar opacities suggest atelectasis.



The above report was generated using voice recognition software. It may contain

grammatical, syntax or spelling errors.







Electronically signed by:  Kareem Arenas M.D.

9/8/2017 9:34 AM



Dictated Date/Time:  9/8/2017 9:18 AM

## 2017-09-08 NOTE — SURGERY PROGRESS NOTE
DATE: 09/08/2017

 

DATE:  09/08/2017  

 

Mr. Stover was seen today.  We removed his chest tube.  His pain is much

better.  He is ambulating in the hallway.  He is on room air.  He is moving

his bowels.  A concern I have is that he has had a productive cough which has

developed since last night.  He coughed up green and yellow sputum today

which appears to be deep.  His lungs sound good bilaterally after removing

the chest tube, but I am concerned about.  I did not see evidence of but

pneumonia.  At this point, I am concerned about sending him home.  I am going

to keep him for 1 day with parenteral antibiotics and see how he looks

tomorrow if he is improved we will transition him to p.o. antibiotics and

send him home.  Pathology is still pending.

## 2017-09-08 NOTE — DIAGNOSTIC IMAGING REPORT
CHEST ONE VIEW PORTABLE



HISTORY:  82 years-old Male LLL acute left lower lobe pneumonia.



COMPARISON: Chest radiograph 9/7/2017



TECHNIQUE: Portable upright AP view of the chest



FINDINGS: 

Cardiac silhouette is again moderately enlarged. Tiny left apical pneumothorax

is unchanged. There is stable positioning of the left-sided chest tube. Mild

amount of subcutaneous emphysema is seen along the left chest wall. Subsegmental

left basilar opacity is again seen. There is mild pulmonary vascular congestion.

Fiducial markers are again seen projecting over the right upper lobe.



The bones are grossly intact.



IMPRESSION: 

1. Persistent tiny left apical pneumothorax with stable positioning of

left-sided chest tube.

2. Unchanged subsegmental left basilar opacity suggesting atelectasis or

pneumonia.

3. Cardiomegaly without overt pulmonary edema.







The above report was generated using voice recognition software. It may contain

grammatical, syntax or spelling errors.







Electronically signed by:  Kareem Arenas M.D.

9/8/2017 7:35 AM



Dictated Date/Time:  9/8/2017 7:32 AM

## 2017-09-09 VITALS
SYSTOLIC BLOOD PRESSURE: 151 MMHG | OXYGEN SATURATION: 94 % | HEART RATE: 70 BPM | DIASTOLIC BLOOD PRESSURE: 78 MMHG | TEMPERATURE: 97.88 F

## 2017-09-09 VITALS
HEART RATE: 80 BPM | OXYGEN SATURATION: 96 % | SYSTOLIC BLOOD PRESSURE: 160 MMHG | DIASTOLIC BLOOD PRESSURE: 83 MMHG | TEMPERATURE: 97.88 F

## 2017-09-09 VITALS
SYSTOLIC BLOOD PRESSURE: 126 MMHG | DIASTOLIC BLOOD PRESSURE: 84 MMHG | HEART RATE: 79 BPM | TEMPERATURE: 97.88 F | OXYGEN SATURATION: 96 %

## 2017-09-09 LAB
CREAT CL PREDICTED SERPL C-G-VRATE: 76.9 ML/MIN
CREAT SERPL-MCNC: 0.9 MG/DL (ref 0.6–1.4)

## 2017-09-09 RX ADMIN — ACETAMINOPHEN SCH MG: 325 TABLET ORAL at 00:02

## 2017-09-09 RX ADMIN — Medication SCH GM: at 09:43

## 2017-09-09 RX ADMIN — ACETAMINOPHEN SCH MG: 325 TABLET ORAL at 23:54

## 2017-09-09 RX ADMIN — ACETAMINOPHEN SCH MG: 325 TABLET ORAL at 12:00

## 2017-09-09 RX ADMIN — PIPERACILLIN AND TAZOBACTAM SCH MLS/HR: 3; .375 INJECTION, POWDER, LYOPHILIZED, FOR SOLUTION INTRAVENOUS; PARENTERAL at 09:42

## 2017-09-09 RX ADMIN — FINASTERIDE SCH MG: 5 TABLET, FILM COATED ORAL at 21:40

## 2017-09-09 RX ADMIN — SIMVASTATIN SCH MG: 40 TABLET, FILM COATED ORAL at 09:44

## 2017-09-09 RX ADMIN — ACETAMINOPHEN SCH MG: 325 TABLET ORAL at 05:14

## 2017-09-09 RX ADMIN — PIPERACILLIN AND TAZOBACTAM SCH MLS/HR: 3; .375 INJECTION, POWDER, LYOPHILIZED, FOR SOLUTION INTRAVENOUS; PARENTERAL at 00:02

## 2017-09-09 RX ADMIN — DOCUSATE SODIUM SCH MG: 100 CAPSULE, LIQUID FILLED ORAL at 21:39

## 2017-09-09 RX ADMIN — PIPERACILLIN AND TAZOBACTAM SCH MLS/HR: 3; .375 INJECTION, POWDER, LYOPHILIZED, FOR SOLUTION INTRAVENOUS; PARENTERAL at 23:54

## 2017-09-09 RX ADMIN — TAMSULOSIN HYDROCHLORIDE SCH MG: 0.4 CAPSULE ORAL at 21:40

## 2017-09-09 RX ADMIN — PIPERACILLIN AND TAZOBACTAM SCH MLS/HR: 3; .375 INJECTION, POWDER, LYOPHILIZED, FOR SOLUTION INTRAVENOUS; PARENTERAL at 18:11

## 2017-09-09 RX ADMIN — ACETAMINOPHEN SCH MG: 325 TABLET ORAL at 18:10

## 2017-09-09 RX ADMIN — ENOXAPARIN SODIUM SCH MG: 40 INJECTION SUBCUTANEOUS at 09:45

## 2017-09-09 RX ADMIN — DOCUSATE SODIUM SCH MG: 100 CAPSULE, LIQUID FILLED ORAL at 09:43

## 2017-09-09 RX ADMIN — RANITIDINE SCH MG: 150 TABLET ORAL at 09:43

## 2017-09-09 RX ADMIN — LISINOPRIL SCH MG: 40 TABLET ORAL at 09:44

## 2017-09-09 RX ADMIN — OXYCODONE HYDROCHLORIDE PRN MG: 5 TABLET ORAL at 18:13

## 2017-09-09 RX ADMIN — RANITIDINE SCH MG: 150 TABLET ORAL at 21:39

## 2017-09-09 NOTE — SURGERY PROGRESS NOTE
Subjective


Date of Service:  Sep 9, 2017.


Pt. notes he is doing well.  He notes improvement since yesterday.  Previously 

noted cough has improved.  No shakes, chills, fevers.  No N/V or abdominal 

pain.  He denies dysuria, frequency, or hesitancy.





Objective


Vitals











  Date Time  Temp Pulse Resp B/P (MAP) Pulse Ox O2 Delivery O2 Flow Rate FiO2


 


9/9/17 06:54 36.6 70 20 151/78 (102) 94 Room Air  


 


9/9/17 00:05      Room Air  


 


9/8/17 23:25 36.7 83 20 158/77 (104) 93 Room Air  


 


9/8/17 16:00      Room Air  


 


9/8/17 15:31 36.4 73 18 144/79 (100) 94 Room Air  


 


9/8/17 07:45     97 Room Air  


 


9/8/17 07:17 36.7 65 16 162/81 (108) 94 Room Air  











Physical Exam


General:  + well developed, + well nourished, 


   No distress


CV:  + RRR


Pulmonary:  + wheezing (noted bilaterally at end of expiration ), 


   No accessory muscle use, No respiratory distress


Abdomen:  + non-distended, + non tender, + soft


Neurologic:  + alert & oriented x 3





Assessment & Plan


82 year old male s/p LLL


-continue mobilization and use of IS





-concern noted yesterday for URI:


   -zosyn started (day #2 today)--will plan on transitioning to oral Augmentin 

at time of d/c 


   -will add nebs due to wheezing that was noted 





OTHER


-Lovenox for DVT prevention 


-keep inhospital due to need for parenteral abx.

## 2017-09-10 VITALS
SYSTOLIC BLOOD PRESSURE: 148 MMHG | HEART RATE: 75 BPM | TEMPERATURE: 97.7 F | DIASTOLIC BLOOD PRESSURE: 81 MMHG | OXYGEN SATURATION: 95 %

## 2017-09-10 VITALS
DIASTOLIC BLOOD PRESSURE: 81 MMHG | SYSTOLIC BLOOD PRESSURE: 148 MMHG | TEMPERATURE: 97.7 F | OXYGEN SATURATION: 95 % | HEART RATE: 75 BPM

## 2017-09-10 LAB
CREAT CL PREDICTED SERPL C-G-VRATE: 85.4 ML/MIN
CREAT SERPL-MCNC: 0.81 MG/DL (ref 0.6–1.4)

## 2017-09-10 RX ADMIN — RANITIDINE SCH MG: 150 TABLET ORAL at 11:35

## 2017-09-10 RX ADMIN — ENOXAPARIN SODIUM SCH MG: 40 INJECTION SUBCUTANEOUS at 11:37

## 2017-09-10 RX ADMIN — PIPERACILLIN AND TAZOBACTAM SCH MLS/HR: 3; .375 INJECTION, POWDER, LYOPHILIZED, FOR SOLUTION INTRAVENOUS; PARENTERAL at 08:14

## 2017-09-10 RX ADMIN — DOCUSATE SODIUM SCH MG: 100 CAPSULE, LIQUID FILLED ORAL at 09:00

## 2017-09-10 RX ADMIN — SIMVASTATIN SCH MG: 40 TABLET, FILM COATED ORAL at 11:35

## 2017-09-10 RX ADMIN — LISINOPRIL SCH MG: 40 TABLET ORAL at 11:35

## 2017-09-10 RX ADMIN — ACETAMINOPHEN SCH MG: 325 TABLET ORAL at 11:37

## 2017-09-10 RX ADMIN — Medication SCH GM: at 11:34

## 2017-09-10 RX ADMIN — ACETAMINOPHEN SCH MG: 325 TABLET ORAL at 05:41

## 2017-09-10 NOTE — DISCHARGE SUMMARY
Discharge Summary


Date of Service


Sep 10, 2017.





Discharge Summary


Admission Date:


Sep 6, 2017 at 06:40


Discharge Date:  Sep 10, 2017


Discharge Disposition:  Home


Principal Diagnosis:  Lung Cancer


Problems/Secondary Diagnoses:


1.  Urinary retention


Procedures:


1.  Navigational bronchoscopy with place of fiducial marker in RUL lesion 


2.  Left VATS with LLL and Lymphadenectomy





Medication Reconciliation


New Medications:  


Amoxicillin & Pot Clavulanate (Augmentin 875-125 mg) 1 Tab Tab


875 MG PO BID for 10 Days, #14 TAB





Tramadol (Ultram) 50 Mg Tab


50 MG PO Q4H PRN for Pain, #30 TAB





Docusate Sodium (Docusate Sodium) 100 Mg Cap


100 MG PO BID for 30 Days, #60 CAP 0 Refills





 


Continued Medications:  


Finasteride (Proscar) 5 Mg Tab


5 MG PO HS, TAB





Lisinopril (Zestril) 40 Mg Tab


40 MG PO QAM, TAB





Naproxen Sodium (Aleve) 220 Mg Tab


220 MG PO Q8 PRN for Pain, TAB





Ranitidine (Zantac) 150 Mg Tab


150 MG PO BID, TAB





Simvastatin (Zocor) 40 Mg Tab


40 MG PO QAM, TAB





Tamsulosin HCl (Tamsulosin HCl) 0.4 Mg Cap


0.4 CAP PO QPM











Discharge Exam


Review of Systems:  


   Constitutional:  No fever, No chills


   Respiratory:  + cough, No shortness of breath


   Cardiovascular:  No chest pain


   Abdomen:  No pain, No nausea, No vomiting


   Genitourinary - Male:  No urinary retention


Physical Exam:  


   General Appearance:  WD/WN, no apparent distress


   Respiratory/Chest:  no respiratory distress, no accessory muscle use, + 

wheezing (occaisional at end of expiration )


   Cardiovascular:  regular rate, rhythm


   Abdomen / GI:  non tender, soft


   Extremities:  no calf tenderness


   Neurologic/Psychiatric:  alert, oriented x 3





Hospital Course


82 year old male with bilateral lung masses


-pt. had navigational bronchoscopy with placement of fiducial markers in right 

sided lesion 


-left VATS with LLL and lymphadenectomy performed


-preliminary path concerning for squamous cell carcinoma





-postop course concerning for:


   -urinary retention:


      -pt. required cole cath but had voiding trial therafter that was 

successful


      -pt. maintained on proscar and flomax at home doses





   -URI:


      -zosyn started (9/8/17) and pt. transitioned to oral augmentin at time of 

d/c (10 day course prescribed)





-chest tube managed and discontinued in appropriate fashion 


-use of IS and mobilization continually encouraged





OTHER


-Lovenox for DVT prevention


Total Time Spent:  Greater than 30 minutes


This includes examination of the patient, discharge planning, medication 

reconciliation, and communication with other providers.





Discharge Instructions


Please refer to the electronic Patient Visit Report (Discharge Instructions) 

for additional information.





Follow-Up


1.  Dr. Bae in 1 week with CXR





Additional Copies To


Maine Cabrera M.D.

## 2017-09-10 NOTE — DISCHARGE INSTRUCTIONS
Discharge Instructions


Date of Service


Sep 10, 2017.





Admission


Reason for Admission:  Mass Left Lobe Of Lung





Discharge


Discharge Diagnosis / Problem:  Mass Left Lobe Of Lung





Discharge Goals


Goal(s):  Learn about illness





Activity Recommendations


Activity Limitations:  as noted below


Lifting Limitations:  none


1.  You may remove all dressings and shower in 3 days.  No tub baths.





2.  Do not drive if taking ultram.





3.  DO not fly until cleared to do so by Dr. Bae.


.





Instructions / Follow-Up


Instructions / Follow-Up


1.  Office appointment with Dr Bea in 1 week.  Office will call you with 

date and time of appointment.  Go to hospital 1 hour before appointment to have 

a chest x-ray taken.





Current Hospital Diet


Patient's current hospital diet: Regular Diet





Discharge Diet


Recommended Diet:  Regular Diet





Procedures


Procedures Performed:  


Electromagnetic Navigational Bronchoscopy with Fiducial Markers, Thorascopic 


Left Lower Lobectomy with Mediastinal Lymphadenectomy





Pending Studies


Studies pending at discharge:  no





Medical Emergencies








.


Who to Call and When:





Medical Emergencies:  If at any time you feel your situation is an emergency, 

please call 911 immediately.





.





Non-Emergent Contact


Non-Emergency issues call your:  Surgeon


Call Non-Emergent contact if:  you have a fever, your pain is not controlled, 

wound has increased drainage


.








"Provider Documentation" section prepared by Freddy Andres.








.





VTE Core Measure


Inpt VTE Proph given/why not?:  Enoxaparin (Lovenox)SQ

## 2017-09-18 ENCOUNTER — HOSPITAL ENCOUNTER (OUTPATIENT)
Dept: HOSPITAL 45 - C.RAD | Age: 82
Discharge: HOME | End: 2017-09-18
Attending: THORACIC SURGERY (CARDIOTHORACIC VASCULAR SURGERY)
Payer: COMMERCIAL

## 2017-09-18 DIAGNOSIS — Z90.2: Primary | ICD-10-CM

## 2017-09-18 NOTE — DIAGNOSTIC IMAGING REPORT
CHEST 2 VIEWS ROUTINE



CLINICAL HISTORY: Z90.2 S/P lobectomy of [id number]    



COMPARISON STUDY:  9/8/2017



FINDINGS: Postsurgical changes are present on the left. There is been resolution

of the previously identified subcutaneous emphysema. Fiducial markers are

visualized in the right upper lung zone unchanged the prior study. There is left

lung volume loss consistent with prior surgery. Persistent left basilar

opacities, likely atelectatic again evident.[ 



IMPRESSION: Postsurgical changes in the left. No acute findings. Resolution of

the left-sided subcutaneous emphysema. Interval resolution of the previously

identified pneumothorax.







Electronically signed by:  Malcolm Macias M.D.

9/18/2017 10:13 AM



Dictated Date/Time:  9/18/2017 10:12 AM

## 2017-10-23 ENCOUNTER — HOSPITAL ENCOUNTER (OUTPATIENT)
Dept: HOSPITAL 45 - C.PET | Age: 82
Discharge: HOME | End: 2017-10-23
Attending: THORACIC SURGERY (CARDIOTHORACIC VASCULAR SURGERY)
Payer: COMMERCIAL

## 2017-10-23 DIAGNOSIS — C34.32: Primary | ICD-10-CM

## 2017-10-24 NOTE — DIAGNOSTIC IMAGING REPORT
PET/CT SKULL-THIGH



HISTORY: Lung carcinoma  NON SMALL CELL LUNG CANCER



TECHNIQUE: PET/CT was performed from the base of the skull through the pelvis

following the intravenous administration of 15.2 mCi of [id number]. Non-contrast CT

imaging was performed over the same range without breath-hold for attenuation

correction of PET images and anatomic correlation, but not for primary

interpretation as it is not of standard diagnostic quality.



CT DOSE:    

 

COMPARISON: 6/28/2017



FINDINGS:

 

HEAD AND NECK:  There is no FDG-avid disease or significant lymphadenopathy in

the imaged portions of the head and the neck.

 

CHEST:  Pulmonary nodularity is again noted. The right upper lung nodular

density has increased from 1 to 1.3 cm. Several very small surrounding nodular

densities are slightly increased in visibility although they are too small to

register with SUV characteristics. SUV characteristics is approximately 4.2

involving the 1.3 cm nodule.



There is a mild increase in size of a right midlung nodule best seen image 76.

This measures 4.5 cm and again is too small to register in reference to

metabolic activity. Several poorly defined micronodules less than 3 mm are noted

bilaterally. These appear slightly progressive.



There appears to have been resolution and/or resection of the spiculated left

lower lobe mass. Moderate pleural thickening is identified in the left lung

base. Pleural thickening shows a minimal increase in SUV activity to

approximately 1.8 which is indeterminate in terms of significance. Physiologic

activity is identified within the myocardium. Postoperative changes are

identified in the left lateral chest wall region best seen transaxial image 93.

 

ABDOMEN/PELVIS:  Below the diaphragm, tracer is distributed physiologically in

the gastrointestinal and genitourinary tracts. There is no significant

lymphadenopathy and no FDG-avid disease.

 

MUSCULOSKELETAL:  There is no FDG-avid or destructive bone lesion.

 

IMPRESSION:



1. Interval resection and/or resolution of the spiculated nodule left lung base.





2. Moderate residual postoperative changes left lung base most likely on a

postoperative basis.

3. Mildly progressive pulmonary nodularity of the right hemithorax.

4. This is highly suspect for progressive metastatic change.

5. Remainder the study remain stable and unremarkable.









The above report was generated using voice recognition software.  It may contain

grammatical, syntax or spelling errors.







Electronically signed by:  Virgil Stuart M.D.

10/24/2017 7:24 AM



Dictated Date/Time:  10/24/2017 7:10 AM

## 2017-12-15 ENCOUNTER — HOSPITAL ENCOUNTER (INPATIENT)
Dept: HOSPITAL 45 - C.ACU | Age: 82
LOS: 4 days | Discharge: HOME HEALTH | DRG: 165 | End: 2017-12-19
Attending: THORACIC SURGERY (CARDIOTHORACIC VASCULAR SURGERY) | Admitting: THORACIC SURGERY (CARDIOTHORACIC VASCULAR SURGERY)
Payer: COMMERCIAL

## 2017-12-15 VITALS
SYSTOLIC BLOOD PRESSURE: 153 MMHG | DIASTOLIC BLOOD PRESSURE: 85 MMHG | OXYGEN SATURATION: 97 % | TEMPERATURE: 97.52 F | HEART RATE: 64 BPM

## 2017-12-15 VITALS — DIASTOLIC BLOOD PRESSURE: 51 MMHG | SYSTOLIC BLOOD PRESSURE: 139 MMHG | HEART RATE: 82 BPM | OXYGEN SATURATION: 95 %

## 2017-12-15 VITALS — OXYGEN SATURATION: 96 % | SYSTOLIC BLOOD PRESSURE: 146 MMHG | HEART RATE: 80 BPM | DIASTOLIC BLOOD PRESSURE: 75 MMHG

## 2017-12-15 VITALS
OXYGEN SATURATION: 97 % | SYSTOLIC BLOOD PRESSURE: 148 MMHG | DIASTOLIC BLOOD PRESSURE: 57 MMHG | TEMPERATURE: 97.7 F | HEART RATE: 83 BPM

## 2017-12-15 VITALS — HEART RATE: 84 BPM | OXYGEN SATURATION: 92 % | DIASTOLIC BLOOD PRESSURE: 73 MMHG | SYSTOLIC BLOOD PRESSURE: 139 MMHG

## 2017-12-15 VITALS — OXYGEN SATURATION: 98 % | HEART RATE: 82 BPM | DIASTOLIC BLOOD PRESSURE: 81 MMHG | SYSTOLIC BLOOD PRESSURE: 135 MMHG

## 2017-12-15 VITALS — OXYGEN SATURATION: 95 % | HEART RATE: 104 BPM | SYSTOLIC BLOOD PRESSURE: 145 MMHG | DIASTOLIC BLOOD PRESSURE: 78 MMHG

## 2017-12-15 VITALS
HEIGHT: 71 IN | WEIGHT: 220.46 LBS | BODY MASS INDEX: 30.86 KG/M2 | BODY MASS INDEX: 30.86 KG/M2 | BODY MASS INDEX: 30.86 KG/M2 | WEIGHT: 220.46 LBS | HEIGHT: 71 IN

## 2017-12-15 VITALS — SYSTOLIC BLOOD PRESSURE: 139 MMHG | DIASTOLIC BLOOD PRESSURE: 78 MMHG | OXYGEN SATURATION: 96 % | HEART RATE: 81 BPM

## 2017-12-15 VITALS
SYSTOLIC BLOOD PRESSURE: 147 MMHG | HEART RATE: 86 BPM | DIASTOLIC BLOOD PRESSURE: 61 MMHG | TEMPERATURE: 97.52 F | OXYGEN SATURATION: 95 %

## 2017-12-15 VITALS — SYSTOLIC BLOOD PRESSURE: 132 MMHG | DIASTOLIC BLOOD PRESSURE: 76 MMHG | OXYGEN SATURATION: 96 % | HEART RATE: 88 BPM

## 2017-12-15 VITALS — OXYGEN SATURATION: 94 %

## 2017-12-15 VITALS — OXYGEN SATURATION: 98 % | DIASTOLIC BLOOD PRESSURE: 49 MMHG | HEART RATE: 83 BPM | SYSTOLIC BLOOD PRESSURE: 130 MMHG

## 2017-12-15 VITALS
HEART RATE: 82 BPM | TEMPERATURE: 97.88 F | SYSTOLIC BLOOD PRESSURE: 150 MMHG | DIASTOLIC BLOOD PRESSURE: 80 MMHG | OXYGEN SATURATION: 93 %

## 2017-12-15 VITALS — SYSTOLIC BLOOD PRESSURE: 133 MMHG | DIASTOLIC BLOOD PRESSURE: 69 MMHG | HEART RATE: 83 BPM | OXYGEN SATURATION: 93 %

## 2017-12-15 VITALS — OXYGEN SATURATION: 95 %

## 2017-12-15 DIAGNOSIS — E66.01: ICD-10-CM

## 2017-12-15 DIAGNOSIS — Z85.118: ICD-10-CM

## 2017-12-15 DIAGNOSIS — Z80.1: ICD-10-CM

## 2017-12-15 DIAGNOSIS — C34.11: Primary | ICD-10-CM

## 2017-12-15 DIAGNOSIS — Z87.891: ICD-10-CM

## 2017-12-15 DIAGNOSIS — R91.8: ICD-10-CM

## 2017-12-15 DIAGNOSIS — Z90.2: ICD-10-CM

## 2017-12-15 DIAGNOSIS — J44.9: ICD-10-CM

## 2017-12-15 PROCEDURE — 0BJ08ZZ INSPECTION OF TRACHEOBRONCHIAL TREE, VIA NATURAL OR ARTIFICIAL OPENING ENDOSCOPIC: ICD-10-PCS | Performed by: THORACIC SURGERY (CARDIOTHORACIC VASCULAR SURGERY)

## 2017-12-15 PROCEDURE — 8E0W4CZ ROBOTIC ASSISTED PROCEDURE OF TRUNK REGION, PERCUTANEOUS ENDOSCOPIC APPROACH: ICD-10-PCS | Performed by: THORACIC SURGERY (CARDIOTHORACIC VASCULAR SURGERY)

## 2017-12-15 PROCEDURE — 07B74ZX EXCISION OF THORAX LYMPHATIC, PERCUTANEOUS ENDOSCOPIC APPROACH, DIAGNOSTIC: ICD-10-PCS | Performed by: THORACIC SURGERY (CARDIOTHORACIC VASCULAR SURGERY)

## 2017-12-15 PROCEDURE — 0BTC4ZZ RESECTION OF RIGHT UPPER LUNG LOBE, PERCUTANEOUS ENDOSCOPIC APPROACH: ICD-10-PCS | Performed by: THORACIC SURGERY (CARDIOTHORACIC VASCULAR SURGERY)

## 2017-12-15 RX ADMIN — MORPHINE SULFATE PRN MG: 2 INJECTION, SOLUTION INTRAMUSCULAR; INTRAVENOUS at 19:53

## 2017-12-15 RX ADMIN — CEFAZOLIN SCH MLS/MIN: 10 INJECTION, POWDER, FOR SOLUTION INTRAVENOUS at 21:24

## 2017-12-15 RX ADMIN — TAMSULOSIN HYDROCHLORIDE SCH MG: 0.4 CAPSULE ORAL at 20:51

## 2017-12-15 RX ADMIN — DEXTROSE AND SODIUM CHLORIDE SCH MLS/HR: 5; .45 INJECTION, SOLUTION INTRAVENOUS at 18:24

## 2017-12-15 RX ADMIN — MORPHINE SULFATE PRN MG: 2 INJECTION, SOLUTION INTRAMUSCULAR; INTRAVENOUS at 23:43

## 2017-12-15 RX ADMIN — DOCUSATE SODIUM SCH MG: 100 CAPSULE, LIQUID FILLED ORAL at 20:51

## 2017-12-15 RX ADMIN — METOCLOPRAMIDE SCH MG: 5 INJECTION, SOLUTION INTRAMUSCULAR; INTRAVENOUS at 18:24

## 2017-12-15 RX ADMIN — ALUMINUM ZIRCONIUM TRICHLOROHYDREX GLY SCH EA: 0.2 STICK TOPICAL at 22:58

## 2017-12-15 RX ADMIN — KETOROLAC TROMETHAMINE SCH MG: 15 INJECTION INTRAMUSCULAR; INTRAVENOUS at 18:24

## 2017-12-15 RX ADMIN — ACETAMINOPHEN SCH MLS/HR: 10 INJECTION, SOLUTION INTRAVENOUS at 19:21

## 2017-12-15 NOTE — CRITICAL CARE CONSULTATION
Critical Care Consultation


Date of Consultation:


Dec 15, 2017.


Attending Physician:


Clarke Bae MD


Reason for Consultation:


Postop thoracotomy.


History of Present Illness


Dear :





Thank you for your kind referral of Mr. Dominguez to critical care service.  This 

is 82-year-old nice gentleman with a history of obstructive lung disease, COPD 

Gold level II, history of left lower lobe resection secondary to non-small cell 

lung CA, underwent right upper lobe lobectomy for right upper lobe lesion.  The 

procedure was done thoracoscopically using da Rachelle system.  The patient had a 

chest tube in place and was brought to the ICU after the procedure.  He was 

already extubated.  The patient appeared lethargic but he was answering 

questions following commands.  He had minimal pain no nausea or vomiting.  No 

cough.  Apparently the patient had mucoid impaction in the right lobe and 

underwent a bronchoscopy by Dr. Bae with removal of an dual luminal 

secretions.  The review of system otherwise was unremarkable.





Social History


Smoking Status:  Former Smoker





Allergies


Coded Allergies:  


     No Known Allergies (Unverified , 12/4/17)





Home Medications


Scheduled


Dutasteride (Avodart), 0.5 MG PO QAM


Lisinopril (Zestril), 40 MG PO QAM


Omeprazole (Prilosec), 10 MG PO QAM


Tamsulosin HCl (Tamsulosin HCl), 0.4 CAP PO QPM





Scheduled PRN


Naproxen Sodium (Aleve), 220 MG PO Q8 PRN for Pain





Current Inpatient Medications





Current Inpatient Medications








 Medications


  (Trade)  Dose


 Ordered  Sig/Kathia


 Route  Start Time


 Stop Time Status Last Admin


Dose Admin


 


 Lactated Ringer's  1,000 ml @ 


 15 mls/hr  Q24H


 IV  12/15/17 06:00


 12/16/17 05:59  12/15/17 09:02


15 MLS/HR


 


 Hydromorphone HCl


  (Dilaudid Inj)  0.5 mg  Q5M  PRN


 IV  12/15/17 14:00


 12/15/17 19:00  12/15/17 17:02


0.5 MG


 


 Ondansetron HCl


  (Zofran Inj)  4 mg  ONE  PRN


 IV  12/15/17 14:00


 12/15/17 19:00   


 


 


 Ephedrine Sulfate


  (EpHEDrine


 SULFATE INJ)  5 mg  Q5M  PRN


 IV  12/15/17 14:00


 12/15/17 19:00   


 


 


 Atropine Sulfate


  (Atropine


 Sulfate 0.1MG/Ml


 Inj)  0.5 mg  Q1M  PRN


 IV  12/15/17 14:00


 12/15/17 19:00   


 


 


 Phenylephrine HCl


  (Mohit-Synephrine


 500MCG/5ML Syr)  100 mcg  Q5M  PRN


 IV  12/15/17 14:00


 12/15/17 19:00   


 


 


 Lisinopril


  (Zestril Tab)  40 mg  QAM


 PO  12/16/17 09:00


 1/15/18 08:59   


 


 


 Tamsulosin HCl


  (Flomax Cap)  0.4 mg  QPM


 PO  12/15/17 21:00


 1/14/18 20:59   


 


 


 Miscellaneous


 Information


  (Order Awaiting


 Action)  1 ea  QS


 N/A  12/16/17 00:00


 1/15/18 00:00   


 


 


 Pantoprazole


 Sodium


  (Protonix Tab)  40 mg  QAM


 PO  12/16/17 09:00


 1/15/18 08:59   


 


 


 Acetaminophen


 1000 mg/Empty Bag  100 ml @ 


 400 mls/hr  Q8H


 IV  12/15/17 20:00


 1/14/18 19:59   


 


 


 Oxycodone HCl


  (Roxicodone


 Immediate Rel Tab)  5 mg  Q6H  PRN


 PO  12/15/17 16:45


 12/29/17 16:44   


 


 


 Enoxaparin Sodium


  (Lovenox Inj)  40 mg  DAILY


 SQ  12/16/17 09:00


 1/15/18 08:59 UNV  


 


 


 Dextrose/Sodium


 Chloride  1,000 ml @ 


 100 mls/hr  Q10H


 IV  12/15/17 18:00


 1/14/18 17:59  12/15/17 18:24


100 MLS/HR


 


 Ondansetron HCl


  (Zofran Inj)  4 mg  Q4H  PRN


 IV  12/15/17 16:45


 1/14/18 16:44   


 


 


 Docusate Sodium


  (coLACE CAP)  100 mg  BID


 PO  12/15/17 21:00


 1/14/18 20:59   


 


 


 Ketorolac


 Tromethamine


  (Toradol Inj)  15 mg  Q8H


 IV.  12/15/17 18:00


 12/17/17 10:01  12/15/17 18:24


15 MG


 


 Metoclopramide HCl


  (Reglan Inj)  10 mg  Q8H


 IV.  12/15/17 18:00


 12/16/17 17:59  12/15/17 18:24


10 MG


 


 Morphine Sulfate


  (MoRPHine


 SULFATE INJ)  2 mg  Q1H  PRN


 IV  12/15/17 16:45


 12/29/17 16:44   


 


 


 Cefazolin Sodium


 2000 mg/Syringe  10 ml @ 


 2.5 mls/min  Q8H


 IV  12/15/17 22:00


 12/16/17 06:03   


 











Review of Systems


Constitutional:  No fever, No chills, No sweats, No weight loss, No weakness, 

No fatigue, No problem reported


Respiratory:  + cough, No sputum, No wheezing, No shortness of breath, No 

dyspnea on exertion, No dyspnea at rest, No hemoptysis, No problem reported


Cardiovascular:  + chest pain, + problem reported (minimal chest pain postop 

understandably.)


Abdomen:  No pain, No nausea, No vomiting, No diarrhea, No constipation, No GI 

bleeding, No problem reported


Musculoskeletal:  No joint pain, No muscle pain, No swelling, No calf pain, No 

problem reported


Neurologic:  No memory loss, No paralysis, No weakness, No numbness/tingling, 

No vertigo, No balance problems, No problem reported


Psychiatric:  No depression symptoms, No anhedonism, No anxiety, No insomnia, 

No substance abuse, No problem reported


Allergic / Immunologic:  No environmental allergies, No seasonal allergies, No 

pet sensitivities, No food allergies, No hives, No frequent infections, No poor 

healing, No prolonged convalescence, No problem reported





Physical Exam











  Date Time  Temp Pulse Resp B/P (MAP) Pulse Ox O2 Delivery O2 Flow Rate FiO2


 


12/15/17 18:30 36.6 82 16 150/80 (103) 93 Nasal Cannula 4.0 


 


12/15/17 18:00 36.5 83 18 148/57 (87) 97 Nasal Cannula 4.0 


 


12/15/17 17:15  80 18 159/68 94 Oxymask 10 


 


12/15/17 17:05  77 18  96 Oxymask 10 


 


12/15/17 16:55  79 18 112/56 97 Oxymask 10 


 


12/15/17 16:47 36.5 70 18 111/63 98 Oxymask 10 


 


12/15/17 16:39     94 Nasal Cannula 4.0 


 


12/15/17 09:14 36.4 64 20 153/85 97 Room Air  








General Appearance:  well-appearing, WD/WN, no apparent distress


Eyes:  PERRLA, no discharge, EOMI


ENT:  normal mouth exam


Neck:  normal range of motion


Respiratory:  other (crackles at the right base.  Chest tube in place.)


Cardiovasular:  regular rate/rhythm, normal S1S2, no M/G/R


Abdomen:  non tender, normal bowel sounds, no rebound, no masses


Upper Extremities:  no edema


Lower Extremities:  no edema


Neuro:  alert, oriented x 3, normal motor exam


Psychiatric:  normal affect





Laboratory Results





Last 24 Hours








Test


  12/15/17


18:37


 


Bedside Glucose 160 mg/dl 











Diagnostic Results


Chest x-ray was reviewed which revealed volume loss of the right upper area, 

right lobe was inflated, pleural effusion and old changes from previous left 

lower lobe lobectomy.





Assessment & Plan


#1.  Right upper lobe lobectomy.


#2 COPD, goal level II.


#3 morbid obesity.


#4 chest pain postop is minimal, chest tube in place, no other symptoms.





Plan:





#1 we'll continue to monitor in ICU.


#2 agree with your current management.


#3 further treatment will be dictated by Dr. Bae, hopefully the patient 

will be discharged to the regular floor in the morning.





Chest x-ray in the morning.





Thank you, discussed the case with Dr. Bae in details, critical care time 

spent with the patient including reviewing the data was 35 minutes.

## 2017-12-15 NOTE — OPERATIVE REPORT
DATE OF OPERATION:  12/15/2017

 

PREOPERATIVE DIAGNOSIS:  Questionable middle lobe torsion.

 

POSTOPERATIVE DIAGNOSIS:  No evidence of middle lobe torsion.

 

PROCEDURE:  Diagnostic bronchoscopy.

 

LOCATION:  [facility].

 

ANESTHESIA:  Local.

 

SPECIFICS OF PROCEDURE:  The patient just had a robotic right upper

lobectomy.  On his postop chest x-ray, I was concerned about a density in the

upper lobe and the upper chest medially.  I was afraid he may have middle

lobe torsion.  Rather than go back to the operating room, I elected to

proceed with bronchoscopy emergently.  The patient was still sedated from

surgery but was off the ventilator and had good saturations.  I anesthetized

the right naris and went down with a small flexible fiberoptic bronchoscope. 

We did use Xylocaine and Xylocaine jelly.  His epiglottis showed his cords

were moving well.  I went down and saw no evidence of any bleeding or

lesions.  His left lower lobe stump looked fine.  The right upper lobe stump

looked fine.  His middle lobe bronchus was widely patent.  There was no

evidence of torsion.  I then sucked out some sputum as I removed the scope. 

He tolerated it well.

 

 

I attest to the content of the Intraoperative Record and any orders documented therein. Any exception
s are noted below.

## 2017-12-15 NOTE — DIAGNOSTIC IMAGING REPORT
CHEST ONE VIEW PORTABLE



CLINICAL HISTORY: 82 years-old Male presenting with RUL mass. 



TECHNIQUE: Portable upright AP view of the chest was obtained.



COMPARISON: 9/18/2017.



FINDINGS:

Atherosclerosis of aortic arch. The left heart border is partially obscured

similar to prior exam. A large bore right pleural drain is in place with

extensive subcutaneous emphysema at the right base of the neck as well as along

the inferior right lateral chest wall. An anterior right pneumothorax is

suggested though no significant pneumothorax is evident at the apex despite

upright positioning. Increased right paramediastinal opacity with elevation of

the right minor fissure in comparison to prior. Overall mildly low lung volumes.

Left pleural effusion cannot be excluded. Degenerative changes of the thoracic

spine. Degenerative changes of the right glenohumeral joint also suspected.

Upper abdomen normal.



IMPRESSION:

1.  Large bore right pleural drain. An anterior right pneumothorax may be

present though no apical pneumothorax is evident despite upright positioning.

This is equivocal. If there is clinical concern, dedicated PA radiograph could

be obtained.



2.  Right paramediastinal opacity with elevation of the right minor fissure

(fraser S sign) could suggest an obstructing central lesion in the right upper

lobe.



3.  Persistent left basilar opacity, possibly atelectasis and pleural fluid.

This is not significantly changed from prior.







Electronically signed by:  Hesham Pizano M.D.

12/15/2017 5:08 PM



Dictated Date/Time:  12/15/2017 5:05 PM

## 2017-12-15 NOTE — DIAGNOSTIC IMAGING REPORT
CHEST ONE VIEW PORTABLE



CLINICAL HISTORY: 82 years-old Male presenting with post bronc, right lung mass.





TECHNIQUE: Portable upright AP view of the chest was obtained.



COMPARISON: 12/15/2017 at 4:50 PM.



FINDINGS:

Large bore right pleural drain remains position at the right apex. Associated

subcutaneous emphysema along the inferior right lateral chest wall and right

base of the neck. Multiple overlying external leads degrade image quality. Low

lung volumes with obscuration of the left heart border and left hemidiaphragm.

Increasing left basilar opacity. Right paramediastinal opacity with apparent

elevation of the right minor fissure. Radiolucency at the paramediastinal right

lung base without evidence of right apical radiolucency to suggest pneumothorax.

Degenerative changes of the glenohumeral joints. Upper abdomen normal.



IMPRESSION:

1.  Radiolucency at the right paramediastinal lung base could relate to anterior

pneumothorax, however, no right apical radiolucency to corroborate the presence

of pneumothorax.



2.  Persistent right paramediastinal opacity.



3.  Increasing left basilar predominant opacity possibly with layering pleural

fluid.



4.  Overall low lung volumes.







Electronically signed by:  Hesham Pizano M.D.

12/15/2017 5:36 PM



Dictated Date/Time:  12/15/2017 5:33 PM

## 2017-12-15 NOTE — OPERATIVE REPORT
DATE OF OPERATION:  12/15/2017

 

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

lobe.

 

POSTOPERATIVE DIAGNOSIS:  Same.

 

PROCEDURE:

1.  Robot-assisted thoracoscopic right upper lobectomy.

2.  Mediastinal lymphadenectomy.

 

SURGEON:  Clarke aBe MD.

 

ASSISTANT:  KEENAN Hamilton.  (Mr. Andres was present during the entire

case.  He was at the bedside during the robotic portion and also handled the

camera we were going in and was the first assistant and stayed the entirety

of the case.  He closed incisions at the end).

 

ANESTHESIA:  General anesthesia, endotracheal intubation using double lumen

tube.

 

SPECIFICS OF PROCEDURE:  Roney Stover is a very interesting 82-year-old male

who underwent a left lower lobectomy for nonsmall cell lung carcinoma several

months ago.  He had 2 different carcinomas and it was elected to proceed with

SBRT to his right upper lobe lesion but he really did not want that.  We had

a long discussion with him of the tumor complexes and rechecked his pulmonary

function after his lobectomy.  He would tolerate a right upper lobe.  I had a

long discussion with the patient and his daughter several occasions in the

office and presented him at our multidisciplinary cancer conference several

times.  We elected to proceed with a right upper lobectomy.

 

On 12/15/2017, the patient was brought to the operating room and underwent an

uncomplicated robotic-assisted right upper lobectomy.  We also did a

lymphadenectomy.  I did an Exparel intercostal block.  He woke up without

difficulty from anesthesia and he was extubated in the room.

 

DESCRIPTION OF PROCEDURE:  The patient was brought to the operating room and

placed in supine position.  General anesthesia induced and endotracheal

intubation was performed with double lumen tube.  After proper monitoring

lines had been placed and prophylactic antibiotics given, the patient was

placed in the left lateral decubitus position.  His right chest prepped and

draped in the usual sterile fashion.  Appropriate timeout had been called. 

Five different thoracoscopy incisions were made.  One 5 mm posterior port was

placed about 4 cm from the midline of the spine and within the 8th

interspace.  I then placed two 8 mm ports and a 12 mm port in the 8th

interspace, each about 10 cm apart.  I placed 12 mm port in a few interspaces

below for the assistant's port.  Upon going in, it could be seen there were

some adhesions from the right middle lobe and upper lobe to the mediastinum

and these were taken down.  After taking down all these, taken down to the

lower lobe.  I then took down the inferior pulmonary ligament.  I really did

not see a level 9 node.  I dissected down the level 8 and came along the

posterior mediastinum and dissected out along the bronchus intermedius and

the mainstem bronchus and removed a level 8 node and level 7 node.  I then

removed a level 10 node and then went above the azygos vein and removed a mat

of level 4 and level 2 nodes.  I then removed 10 nodes from posterior and

removed several level 11, level 12 node.  After dissecting out posteriorly,

we had seen the origin of the bronchus.  I completed the fissure between the

lower lobe and the upper lobe and could see the take off of the artery

nicely; however, it was a difficult angle to get our stapler out.  For this

reason, I then went anterior and superior and took down both the left pleura

below the azygos vein medially and took out lymph node packets from here.  I

dissected out the vein.  I had difficulty seeing the middle lobe vein and I

had to dissect this out rather extensively before we finally got to this and

got around this and fired an Endo-SHON stapler around this.  We then fired an

Endo-SHON stapler across apical anterior branch and this freed up things quite

nicely.  Upon coming down, I completed the fissure medially and upon flipping

this up, we were able to fire the stapler one more time to divide the

arteries; however, there was a branch which was coming from the middle lobe

artery up to the upper lobe and I put a clip on this and divided it.  It was

rather small.  We then pulled the lobe up and divided the right upper lobe

bronchus with an Endo-SHON stapler.  An Endobag was used to deliver this off

the field.  I did put a clip on one of the arterial staple lines, which was

bleeding a small amount.  We really did not lose much blood.  We irrigated

out the chest.  We really did not have much of an air leak.  A 24-Afghan

chest tube was placed through the anterior thoracoscopy port and directed

towards the apex.  A 0 Vicryl was used to close the muscle layers of the

larger port and we did have to enlarge the assistant's port to get the lobe

out.  After irrigating this out, we undocked the robot.  We then closed the

incisions as stated above.  4-0 Monocryl was used to close the skin layers of

each.  The patient was extubated in the room and transported to the

postanesthesia care unit in stable condition.

 

 

I attest to the content of the Intraoperative Record and any orders documented therein. Any exception
s are noted below.

## 2017-12-15 NOTE — ANESTHESIOLOGY PROGRESS NOTE
Anesthesia Post Op Note


Date & Time


Dec 15, 2017 at 17:35





Vital Signs


Pain Intensity:  0





Vital Signs Past 12 Hours








  Date Time  Temp Pulse Resp B/P (MAP) Pulse Ox O2 Delivery O2 Flow Rate FiO2


 


12/15/17 17:15  80 18 159/68 94 Oxymask 10 


 


12/15/17 17:05  77 18  96 Oxymask 10 


 


12/15/17 16:55  79 18 112/56 97 Oxymask 10 


 


12/15/17 16:47 36.5 70 18 111/63 98 Oxymask 10 


 


12/15/17 09:14 36.4 64 20 153/85 97 Room Air  











Notes


Mental Status:  alert / awake / arousable, participated in evaluation


Pt Amnestic to Procedure:  Yes


Nausea / Vomiting:  adequately controlled


Pain:  adequately controlled


Airway Patency, RR, SpO2:  stable & adequate


BP & HR:  stable & adequate


Hydration State:  stable & adequate


Anesthetic Complications:  no major complications apparent

## 2017-12-16 VITALS — OXYGEN SATURATION: 96 % | SYSTOLIC BLOOD PRESSURE: 118 MMHG | HEART RATE: 83 BPM | DIASTOLIC BLOOD PRESSURE: 40 MMHG

## 2017-12-16 VITALS
HEART RATE: 79 BPM | OXYGEN SATURATION: 96 % | TEMPERATURE: 98.06 F | SYSTOLIC BLOOD PRESSURE: 117 MMHG | DIASTOLIC BLOOD PRESSURE: 45 MMHG

## 2017-12-16 VITALS — HEART RATE: 81 BPM | DIASTOLIC BLOOD PRESSURE: 41 MMHG | OXYGEN SATURATION: 97 % | SYSTOLIC BLOOD PRESSURE: 135 MMHG

## 2017-12-16 VITALS
SYSTOLIC BLOOD PRESSURE: 116 MMHG | DIASTOLIC BLOOD PRESSURE: 66 MMHG | TEMPERATURE: 97.7 F | OXYGEN SATURATION: 95 % | HEART RATE: 85 BPM

## 2017-12-16 VITALS
HEART RATE: 79 BPM | OXYGEN SATURATION: 95 % | TEMPERATURE: 97.52 F | SYSTOLIC BLOOD PRESSURE: 126 MMHG | DIASTOLIC BLOOD PRESSURE: 73 MMHG

## 2017-12-16 VITALS
SYSTOLIC BLOOD PRESSURE: 115 MMHG | TEMPERATURE: 97.52 F | HEART RATE: 72 BPM | DIASTOLIC BLOOD PRESSURE: 63 MMHG | OXYGEN SATURATION: 95 %

## 2017-12-16 VITALS
OXYGEN SATURATION: 95 % | HEART RATE: 86 BPM | SYSTOLIC BLOOD PRESSURE: 130 MMHG | TEMPERATURE: 98.06 F | DIASTOLIC BLOOD PRESSURE: 66 MMHG

## 2017-12-16 VITALS
OXYGEN SATURATION: 92 % | TEMPERATURE: 97.52 F | DIASTOLIC BLOOD PRESSURE: 77 MMHG | SYSTOLIC BLOOD PRESSURE: 113 MMHG | HEART RATE: 75 BPM

## 2017-12-16 VITALS — OXYGEN SATURATION: 95 %

## 2017-12-16 LAB
BASOPHILS # BLD: 0.01 K/UL (ref 0–0.2)
BASOPHILS NFR BLD: 0.1 %
BUN SERPL-MCNC: 20 MG/DL (ref 7–18)
CALCIUM SERPL-MCNC: 7.6 MG/DL (ref 8.5–10.1)
CO2 SERPL-SCNC: 26 MMOL/L (ref 21–32)
CREAT SERPL-MCNC: 0.93 MG/DL (ref 0.6–1.4)
EOS ABS #: 0.02 K/UL (ref 0–0.5)
EOSINOPHIL NFR BLD AUTO: 121 K/UL (ref 130–400)
GLUCOSE SERPL-MCNC: 157 MG/DL (ref 70–99)
HCT VFR BLD CALC: 37.6 % (ref 42–52)
HGB BLD-MCNC: 13.1 G/DL (ref 14–18)
IG#: 0.08 K/UL (ref 0–0.02)
IMM GRANULOCYTES NFR BLD AUTO: 16.2 %
INR PPP: 1.1 (ref 0.9–1.1)
LYMPHOCYTES # BLD: 2.01 K/UL (ref 1.2–3.4)
MCH RBC QN AUTO: 30.6 PG (ref 25–34)
MCHC RBC AUTO-ENTMCNC: 34.8 G/DL (ref 32–36)
MCV RBC AUTO: 87.9 FL (ref 80–100)
MONO ABS #: 0.77 K/UL (ref 0.11–0.59)
MONOCYTES NFR BLD: 6.2 %
NEUT ABS #: 9.5 K/UL (ref 1.4–6.5)
NEUTROPHILS # BLD AUTO: 0.2 %
NEUTROPHILS NFR BLD AUTO: 76.7 %
PMV BLD AUTO: 9.9 FL (ref 7.4–10.4)
POTASSIUM SERPL-SCNC: 3.7 MMOL/L (ref 3.5–5.1)
RED CELL DISTRIBUTION WIDTH CV: 15 % (ref 11.5–14.5)
RED CELL DISTRIBUTION WIDTH SD: 48.1 FL (ref 36.4–46.3)
SODIUM SERPL-SCNC: 134 MMOL/L (ref 136–145)
WBC # BLD AUTO: 12.39 K/UL (ref 4.8–10.8)

## 2017-12-16 RX ADMIN — CEFAZOLIN SCH MLS/MIN: 10 INJECTION, POWDER, FOR SOLUTION INTRAVENOUS at 06:08

## 2017-12-16 RX ADMIN — OXYCODONE HYDROCHLORIDE PRN MG: 5 TABLET ORAL at 23:42

## 2017-12-16 RX ADMIN — ACETAMINOPHEN SCH MG: 500 TABLET, COATED ORAL at 21:41

## 2017-12-16 RX ADMIN — DEXTROSE AND SODIUM CHLORIDE SCH MLS/HR: 5; .45 INJECTION, SOLUTION INTRAVENOUS at 04:29

## 2017-12-16 RX ADMIN — TAMSULOSIN HYDROCHLORIDE SCH MG: 0.4 CAPSULE ORAL at 21:41

## 2017-12-16 RX ADMIN — ALUMINUM ZIRCONIUM TRICHLOROHYDREX GLY SCH EA: 0.2 STICK TOPICAL at 13:50

## 2017-12-16 RX ADMIN — KETOROLAC TROMETHAMINE SCH MG: 15 INJECTION INTRAMUSCULAR; INTRAVENOUS at 18:55

## 2017-12-16 RX ADMIN — KETOROLAC TROMETHAMINE SCH MG: 15 INJECTION INTRAMUSCULAR; INTRAVENOUS at 09:46

## 2017-12-16 RX ADMIN — ACETAMINOPHEN SCH MLS/HR: 10 INJECTION, SOLUTION INTRAVENOUS at 04:28

## 2017-12-16 RX ADMIN — METOCLOPRAMIDE SCH MG: 5 INJECTION, SOLUTION INTRAMUSCULAR; INTRAVENOUS at 01:31

## 2017-12-16 RX ADMIN — METOCLOPRAMIDE SCH MG: 5 INJECTION, SOLUTION INTRAMUSCULAR; INTRAVENOUS at 09:45

## 2017-12-16 RX ADMIN — ALUMINUM ZIRCONIUM TRICHLOROHYDREX GLY SCH EA: 0.2 STICK TOPICAL at 23:39

## 2017-12-16 RX ADMIN — ENOXAPARIN SODIUM SCH MG: 40 INJECTION SUBCUTANEOUS at 08:46

## 2017-12-16 RX ADMIN — Medication SCH GM: at 18:53

## 2017-12-16 RX ADMIN — DOCUSATE SODIUM SCH MG: 100 CAPSULE, LIQUID FILLED ORAL at 08:46

## 2017-12-16 RX ADMIN — KETOROLAC TROMETHAMINE SCH MG: 15 INJECTION INTRAMUSCULAR; INTRAVENOUS at 01:31

## 2017-12-16 RX ADMIN — PANTOPRAZOLE SCH MG: 40 TABLET, DELAYED RELEASE ORAL at 08:46

## 2017-12-16 RX ADMIN — LISINOPRIL SCH MG: 40 TABLET ORAL at 08:46

## 2017-12-16 RX ADMIN — ACETAMINOPHEN SCH MLS/HR: 10 INJECTION, SOLUTION INTRAVENOUS at 12:00

## 2017-12-16 RX ADMIN — ALUMINUM ZIRCONIUM TRICHLOROHYDREX GLY SCH EA: 0.2 STICK TOPICAL at 07:26

## 2017-12-16 RX ADMIN — OXYCODONE HYDROCHLORIDE PRN MG: 5 TABLET ORAL at 15:23

## 2017-12-16 RX ADMIN — DOCUSATE SODIUM SCH MG: 100 CAPSULE, LIQUID FILLED ORAL at 21:41

## 2017-12-16 NOTE — DIAGNOSTIC IMAGING REPORT
CHEST ONE VIEW PORTABLE



HISTORY:      Right upper lobectomy. Postop.



COMPARISON: Chest 12/15/2017.



FINDINGS: Right-sided chest tube remains unchanged in position. Small right

apical medial pneumothorax. The heart is stable in size. Suspect a trace left

pleural effusion. Right superior ventricular subcutaneous emphysema has slightly

improved.



IMPRESSION:

Small right apical medial pneumothorax. The right chest tube is unchanged in

position.







Electronically signed by:  Nikolay Macario M.D.

12/16/2017 7:51 AM



Dictated Date/Time:  12/16/2017 7:49 AM

## 2017-12-16 NOTE — SURGERY PROGRESS NOTE
DATE: 12/16/2017

 

SUBJECTIVE:  Jolanta was seen today on 12/16/2017, 1 day after a robotic right

upper lobectomy and mediastinal lymphadenectomy.  He looks superb.  He is on

room air.  He is sitting up in the chair.  He just finished breakfast and

eating a regular diet.  His lungs sound great.  He has no air leak.  He has

only drained 80 mL of a serous fluid over the last shift.  His x-ray looks

very good.

 

I reviewed all of his labs and all look quite good too with  hemoglobin of

13.1.

 

ASSESSMENT AND PLAN:  Postop day #1, status post robot-assisted thoracoscopic

right upper lobectomy and mediastinal lymphadenectomy.  We are going to

transfer him to the floor.  He should be going home in the next 1-2 days.

 

 

 

 

WERO

## 2017-12-17 VITALS
TEMPERATURE: 97.52 F | OXYGEN SATURATION: 95 % | HEART RATE: 77 BPM | SYSTOLIC BLOOD PRESSURE: 148 MMHG | DIASTOLIC BLOOD PRESSURE: 75 MMHG

## 2017-12-17 VITALS
OXYGEN SATURATION: 95 % | SYSTOLIC BLOOD PRESSURE: 106 MMHG | HEART RATE: 83 BPM | DIASTOLIC BLOOD PRESSURE: 68 MMHG | TEMPERATURE: 98.6 F

## 2017-12-17 VITALS
HEART RATE: 76 BPM | TEMPERATURE: 98.24 F | DIASTOLIC BLOOD PRESSURE: 71 MMHG | OXYGEN SATURATION: 96 % | SYSTOLIC BLOOD PRESSURE: 127 MMHG

## 2017-12-17 RX ADMIN — OXYCODONE HYDROCHLORIDE PRN MG: 5 TABLET ORAL at 23:56

## 2017-12-17 RX ADMIN — ALUMINUM ZIRCONIUM TRICHLOROHYDREX GLY SCH EA: 0.2 STICK TOPICAL at 23:56

## 2017-12-17 RX ADMIN — LISINOPRIL SCH MG: 40 TABLET ORAL at 08:29

## 2017-12-17 RX ADMIN — ACETAMINOPHEN SCH MG: 500 TABLET, COATED ORAL at 14:01

## 2017-12-17 RX ADMIN — PANTOPRAZOLE SCH MG: 40 TABLET, DELAYED RELEASE ORAL at 08:29

## 2017-12-17 RX ADMIN — Medication SCH GM: at 21:11

## 2017-12-17 RX ADMIN — ALUMINUM ZIRCONIUM TRICHLOROHYDREX GLY SCH EA: 0.2 STICK TOPICAL at 08:32

## 2017-12-17 RX ADMIN — ACETAMINOPHEN SCH MG: 500 TABLET, COATED ORAL at 21:13

## 2017-12-17 RX ADMIN — DOCUSATE SODIUM SCH MG: 100 CAPSULE, LIQUID FILLED ORAL at 08:29

## 2017-12-17 RX ADMIN — KETOROLAC TROMETHAMINE SCH MG: 15 INJECTION INTRAMUSCULAR; INTRAVENOUS at 02:10

## 2017-12-17 RX ADMIN — ACETAMINOPHEN SCH MG: 500 TABLET, COATED ORAL at 05:39

## 2017-12-17 RX ADMIN — Medication SCH GM: at 08:32

## 2017-12-17 RX ADMIN — KETOROLAC TROMETHAMINE SCH MG: 15 INJECTION INTRAMUSCULAR; INTRAVENOUS at 09:49

## 2017-12-17 RX ADMIN — ENOXAPARIN SODIUM SCH MG: 40 INJECTION SUBCUTANEOUS at 08:30

## 2017-12-17 RX ADMIN — DOCUSATE SODIUM SCH MG: 100 CAPSULE, LIQUID FILLED ORAL at 21:12

## 2017-12-17 RX ADMIN — ALUMINUM ZIRCONIUM TRICHLOROHYDREX GLY SCH EA: 0.2 STICK TOPICAL at 16:00

## 2017-12-17 RX ADMIN — OXYCODONE HYDROCHLORIDE PRN MG: 5 TABLET ORAL at 17:25

## 2017-12-17 RX ADMIN — TAMSULOSIN HYDROCHLORIDE SCH MG: 0.4 CAPSULE ORAL at 21:12

## 2017-12-17 NOTE — DIAGNOSTIC IMAGING REPORT
CHEST ONE VIEW PORTABLE



CLINICAL HISTORY: 82 years-old Male presenting with RUL. 



TECHNIQUE: Portable upright AP view of the chest was obtained.



COMPARISON: 12/16/2017.



FINDINGS:

Large bore right pleural drain remains positioned at the right apex.

Atherosclerosis of aortic arch. Cardiac silhouette remains mildly prominent with

obscuration of the left heart border. Right upper paramediastinal opacity

persists. Possible medial right apical pneumothorax suggested given right

paratracheal radiolucency. Left pleural effusion suspected. Degenerative changes

of the glenohumeral joints. Upper abdomen normal.



IMPRESSION:

1.  Right pleural drain remains in place with possible paramediastinal trace

right apical pneumothorax.



2.  Persistent right upper paramediastinal opacity.



3.  Left pleural effusion and basilar consolidation, possibly atelectasis. This

is stable to slightly increased from prior.







Electronically signed by:  Hesham Pizano M.D.

12/17/2017 6:05 AM



Dictated Date/Time:  12/17/2017 6:02 AM

## 2017-12-17 NOTE — SURGERY PROGRESS NOTE
Subjective


Date of Service:  Dec 17, 2017.


Pt. denies CP or SOB.  He is ambulating in hallway and tolerating oral intake.  

No difficulty voiding.





Objective


Vitals











  Date Time  Temp Pulse Resp B/P (MAP) Pulse Ox O2 Delivery O2 Flow Rate FiO2


 


12/17/17 07:04 36.8 76 18 127/71 (89) 96 Room Air  


 


12/16/17 23:55      Room Air  


 


12/16/17 23:40 36.7 86 16 130/66 (87) 95 Room Air  


 


12/16/17 15:44 36.4 72 18 115/63 (80) 95 Room Air  


 


12/16/17 15:20      Room Air  











Physical Exam


General:  + well developed, + well nourished, 


   No distress


CV:  + RRR


Pulmonary:  + pertinent finding (decreased at bases R>L), 


   No accessory muscle use, No respiratory distress


Abdomen:  + non-distended, + non tender, + soft


Extremities:  No calf tenderness


Neurologic:  + alert & oriented x 3





Radiology


small right apical pneumothorax noted





Drains / Tubes


chest tube (430 cc last 24 hours; 80 cc last 8 hours; no air leak)





Assessment & Plan


82 year old male s/p RUL


-keep CT in place due to drainage 





-continue IS, pulmonary toilet and mobilization


-continue pain control measures





OTHER


-lovenox for DVT prevention 


-d/c home once CT removed, possibly tomorrow

## 2017-12-18 VITALS
OXYGEN SATURATION: 95 % | SYSTOLIC BLOOD PRESSURE: 142 MMHG | HEART RATE: 83 BPM | TEMPERATURE: 98.06 F | DIASTOLIC BLOOD PRESSURE: 75 MMHG

## 2017-12-18 VITALS
HEART RATE: 84 BPM | SYSTOLIC BLOOD PRESSURE: 145 MMHG | TEMPERATURE: 97.7 F | DIASTOLIC BLOOD PRESSURE: 81 MMHG | OXYGEN SATURATION: 96 %

## 2017-12-18 VITALS
TEMPERATURE: 97.7 F | HEART RATE: 84 BPM | OXYGEN SATURATION: 95 % | DIASTOLIC BLOOD PRESSURE: 81 MMHG | SYSTOLIC BLOOD PRESSURE: 145 MMHG

## 2017-12-18 VITALS
DIASTOLIC BLOOD PRESSURE: 83 MMHG | TEMPERATURE: 98.06 F | HEART RATE: 71 BPM | SYSTOLIC BLOOD PRESSURE: 157 MMHG | OXYGEN SATURATION: 96 %

## 2017-12-18 VITALS — OXYGEN SATURATION: 95 %

## 2017-12-18 RX ADMIN — ACETAMINOPHEN SCH MG: 500 TABLET, COATED ORAL at 05:52

## 2017-12-18 RX ADMIN — PANTOPRAZOLE SCH MG: 40 TABLET, DELAYED RELEASE ORAL at 08:30

## 2017-12-18 RX ADMIN — ENOXAPARIN SODIUM SCH MG: 40 INJECTION SUBCUTANEOUS at 08:31

## 2017-12-18 RX ADMIN — GUAIFENESIN SCH MG: 600 TABLET, EXTENDED RELEASE ORAL at 21:28

## 2017-12-18 RX ADMIN — ACETAMINOPHEN SCH MG: 500 TABLET, COATED ORAL at 21:29

## 2017-12-18 RX ADMIN — Medication SCH GM: at 21:28

## 2017-12-18 RX ADMIN — TAMSULOSIN HYDROCHLORIDE SCH MG: 0.4 CAPSULE ORAL at 21:28

## 2017-12-18 RX ADMIN — DOCUSATE SODIUM SCH MG: 100 CAPSULE, LIQUID FILLED ORAL at 08:30

## 2017-12-18 RX ADMIN — ALUMINUM ZIRCONIUM TRICHLOROHYDREX GLY SCH EA: 0.2 STICK TOPICAL at 07:44

## 2017-12-18 RX ADMIN — LISINOPRIL SCH MG: 40 TABLET ORAL at 08:30

## 2017-12-18 RX ADMIN — GUAIFENESIN SCH MG: 600 TABLET, EXTENDED RELEASE ORAL at 17:33

## 2017-12-18 RX ADMIN — ACETAMINOPHEN SCH MG: 500 TABLET, COATED ORAL at 14:11

## 2017-12-18 RX ADMIN — ALUMINUM ZIRCONIUM TRICHLOROHYDREX GLY SCH EA: 0.2 STICK TOPICAL at 16:00

## 2017-12-18 RX ADMIN — Medication SCH GM: at 08:30

## 2017-12-18 RX ADMIN — DOCUSATE SODIUM SCH MG: 100 CAPSULE, LIQUID FILLED ORAL at 21:28

## 2017-12-18 RX ADMIN — OXYCODONE HYDROCHLORIDE PRN MG: 5 TABLET ORAL at 05:54

## 2017-12-18 NOTE — DISCHARGE INSTRUCTIONS
Discharge Instructions


Date of Service


Dec 18, 2017.





Admission


Reason for Admission:  Right Lung Mass, Left Lung Cancer





Discharge


Discharge Diagnosis / Problem:   Right Lung Mass, Left Lung Cancer





Discharge Goals


Goal(s):  Learn about illness





Activity Recommendations


Activity Limitations:  as noted below


Lifting Limitations:  none


1.  Do not drive until cleared by Dr. Bae.





2.  Do not drive if taking ultram.


.





Instructions / Follow-Up


Instructions / Follow-Up


1.  Office appointment with Dr. Bae in 1 week.  Office will call you with 

date and time of appointment.  Go to hospital 1 hour before appointment to have 

a chest x-ray taken.





2.  You may remove dressings in 3 days and shower thereafter.  No tube baths.





Current Hospital Diet


Patient's current hospital diet: Regular Diet





Discharge Diet


Recommended Diet:  Regular Diet





Procedures


Procedures Performed:  


Robot Assisted Right Thoracoscopy with Right Upper Lobectomy and Mediastinal 


Lymphadenectomy





Pending Studies


Studies pending at discharge:  no





Medical Emergencies








.


Who to Call and When:





Medical Emergencies:  If at any time you feel your situation is an emergency, 

please call 911 immediately.





.





Non-Emergent Contact


Non-Emergency issues call your:  Surgeon


Call Non-Emergent contact if:  you have a fever, your pain is not controlled, 

wound has increased drainage


.








"Provider Documentation" section prepared by Freddy Andres.








.





VTE Core Measure


Inpt VTE Proph given/why not?:  Enoxaparin (Lovenox)SQ

## 2017-12-18 NOTE — DIAGNOSTIC IMAGING REPORT
CHEST ONE VIEW PORTABLE



HISTORY:      tube removal 



COMPARISON: Chest 12/17/2017. 



FINDINGS: The right-sided chest tube has been removed. No definite right

pneumothorax. Suspect trace bilateral pleural effusions. The heart remains

mildly enlarged. There are low lung volumes. Left basilar linear densities favor

subsegmental atelectasis. Right medial apical/paratracheal density remains

unchanged.





IMPRESSION:



1. Interval removal of the right chest tube. No definite pneumothorax.

2. Trace bilateral pleural effusions.

3. Right medial apical/paratracheal density remains unchanged and may be due to

the due to the postoperative change.







Electronically signed by:  Nikolay Macario M.D.

12/18/2017 9:09 AM



Dictated Date/Time:  12/18/2017 9:05 AM

## 2017-12-18 NOTE — SURGERY PROGRESS NOTE
Subjective


Date of Service:  Dec 18, 2017.


Pt. notes pain from chest tube.  No SOB.  No N/V.  he is ambulating in hallway 

and tolerating po intake.  He denies voiding difficulty.





Objective


Vitals











  Date Time  Temp Pulse Resp B/P (MAP) Pulse Ox O2 Delivery O2 Flow Rate FiO2


 


12/18/17 07:30     95 Room Air  


 


12/18/17 07:07 36.5 84 18 145/81 (102) 96 Room Air  


 


12/18/17 00:05      Room Air  


 


12/17/17 23:15 37.0 83 16 131/68 (89) 95 Room Air  


 


12/17/17 16:00      Room Air  


 


12/17/17 15:15 36.4 77 18 148/75 (99) 95 Room Air  











Physical Exam


General:  + well developed, + well nourished


CV:  + RRR


Pulmonary:  + pertinent finding (BS decreased at bases; inspiratory effort 

limited by pain.), 


   No accessory muscle use, No respiratory distress


Abdomen:  + non-distended, + non tender, + soft


Extremities:  No calf tenderness


Neurologic:  + alert & oriented x 3





Drains / Tubes


chest tube (180 cc last 24 hours; 130 cc last shift; no air leak)





Assessment & Plan


82 year old male s/p RUL


-will d/c chest tube this am





-continue us e of IS


-continue pulmonary toilet and mobilization


-continue pain control measures





OTHER


-lovenox for DVT prevention 


-d/c home today or tomorrow

## 2017-12-18 NOTE — ANESTHESIOLOGY PROGRESS NOTE
Anesthesia Post Op Note


Date & Time


Dec 18, 2017 at 07:46





Vital Signs





Vital Signs Past 12 Hours








  Date Time  Temp Pulse Resp B/P (MAP) Pulse Ox O2 Delivery O2 Flow Rate FiO2


 


12/18/17 07:30     95 Room Air  


 


12/18/17 00:05      Room Air  


 


12/17/17 23:15 37.0 83 16 131/68 (89) 95 Room Air  











Notes


Mental Status:  alert / awake / arousable, participated in evaluation


Pt Amnestic to Procedure:  Yes


Nausea / Vomiting:  adequately controlled


Pain:  adequately controlled


Airway Patency, RR, SpO2:  stable & adequate


BP & HR:  stable & adequate


Hydration State:  stable & adequate


Anesthetic Complications:  no major complications apparent

## 2017-12-19 VITALS
TEMPERATURE: 98.06 F | HEART RATE: 72 BPM | OXYGEN SATURATION: 97 % | DIASTOLIC BLOOD PRESSURE: 74 MMHG | SYSTOLIC BLOOD PRESSURE: 135 MMHG

## 2017-12-19 VITALS
HEART RATE: 67 BPM | SYSTOLIC BLOOD PRESSURE: 129 MMHG | DIASTOLIC BLOOD PRESSURE: 80 MMHG | TEMPERATURE: 97.52 F | OXYGEN SATURATION: 94 %

## 2017-12-19 RX ADMIN — PANTOPRAZOLE SCH MG: 40 TABLET, DELAYED RELEASE ORAL at 08:50

## 2017-12-19 RX ADMIN — Medication SCH GM: at 08:50

## 2017-12-19 RX ADMIN — GUAIFENESIN SCH MG: 600 TABLET, EXTENDED RELEASE ORAL at 08:52

## 2017-12-19 RX ADMIN — DOCUSATE SODIUM SCH MG: 100 CAPSULE, LIQUID FILLED ORAL at 08:50

## 2017-12-19 RX ADMIN — ALUMINUM ZIRCONIUM TRICHLOROHYDREX GLY SCH EA: 0.2 STICK TOPICAL at 08:00

## 2017-12-19 RX ADMIN — ENOXAPARIN SODIUM SCH MG: 40 INJECTION SUBCUTANEOUS at 08:51

## 2017-12-19 RX ADMIN — ALUMINUM ZIRCONIUM TRICHLOROHYDREX GLY SCH EA: 0.2 STICK TOPICAL at 00:00

## 2017-12-19 RX ADMIN — ACETAMINOPHEN SCH MG: 500 TABLET, COATED ORAL at 06:06

## 2017-12-19 RX ADMIN — LISINOPRIL SCH MG: 40 TABLET ORAL at 08:50

## 2017-12-19 NOTE — DISCHARGE SUMMARY
DISCHARGE DIAGNOSES:

1.  Non-small cell lung carcinoma, right upper lobe.

2.  Status post left lower lobectomy for non-small cell lung carcinoma in the

past.

3.  Long history of cigarette smoking.

 

HOSPITAL COURSE:  Roney Stover is remarkable 82-year-old male who has been

smoking his whole life.  Back in September he underwent a thoracoscopic left

lower lobectomy and was found to have a non-small cell lung carcinoma in his

right upper lobe also.  His mediastinal lymph nodes were negative on biopsies

multiple times.  We had a long talk about this and decided to proceed with

SBRT; however, the patient really did not want this.  After much discussion,

we elected to proceed with a minimally invasive right upper lobectomy.

 

On 12/15/2017 the patient underwent uncomplicated robotic right upper

lobectomy with mediastinal lymphadenectomy.  The path is still pending.  He

did well and was watched in the unit overnight and then sent to the floor. 

He did very well with this and was on room air and ambulating in the hallway.

 His chest tube was pulled out on postop day 3; however, he really did not

feel very well.  There were concerns about him going home by himself, so he

was kept overnight.  He felt better on postop day #4.  I discussed this with

his daughter.  I gave her my cell phone number and said I would be glad that

she should call me should any problems arise with him over the holiday

weekend.  I will see him back in a week with a chest x-ray.

## 2017-12-26 ENCOUNTER — HOSPITAL ENCOUNTER (OUTPATIENT)
Dept: HOSPITAL 45 - C.RAD | Age: 82
Discharge: HOME | End: 2017-12-26
Attending: THORACIC SURGERY (CARDIOTHORACIC VASCULAR SURGERY)
Payer: COMMERCIAL

## 2017-12-26 DIAGNOSIS — C34.32: Primary | ICD-10-CM

## 2017-12-26 DIAGNOSIS — R91.8: ICD-10-CM

## 2017-12-26 NOTE — DIAGNOSTIC IMAGING REPORT
CHEST 2 VIEWS ROUTINE



CLINICAL HISTORY: LUNG NODULES, MALIGNANT NEOPLASM LOWER LOBE L LUNG    



COMPARISON STUDY:  12/18/2017



FINDINGS: Mild improvement in aeration left hemithorax. Right lung currently is

clear. Minimal post procedural scarring central right hemidiaphragm. No focal

infiltrative process of the right hemithorax. No significant cardiac

enlargement. Improved superior mediastinal appearance



IMPRESSION:  Improved postprocedural chest series. No significant pneumothorax.











The above report was generated using voice recognition software.  It may contain

grammatical, syntax or spelling errors.









Electronically signed by:  Virgil Stuart M.D.

12/26/2017 10:40 AM



Dictated Date/Time:  12/26/2017 10:36 AM

## 2018-02-01 ENCOUNTER — HOSPITAL ENCOUNTER (OUTPATIENT)
Dept: HOSPITAL 45 - C.RAD | Age: 83
Discharge: HOME | End: 2018-02-01
Attending: PHYSICIAN ASSISTANT
Payer: OTHER GOVERNMENT

## 2018-02-01 DIAGNOSIS — J90: ICD-10-CM

## 2018-02-01 DIAGNOSIS — C34.32: Primary | ICD-10-CM

## 2018-02-01 NOTE — DIAGNOSTIC IMAGING REPORT
CHEST 2 VIEWS ROUTINE



CLINICAL HISTORY: C34.32 Malignant neoplasm of lower lobe of left [id number]

   



COMPARISON STUDY:  12/26/2017



FINDINGS: Postsurgical changes are again evident. There is evidence for

pulmonary emphysema. There are small bilateral pleural effusions. There is no

focal pulmonary consolidation.[ 



IMPRESSION: Small bilateral pleural effusions. Postsurgical change. No acute

rectal consolidation







Electronically signed by:  Malcolm Macias M.D.

2/1/2018 11:15 AM



Dictated Date/Time:  2/1/2018 11:14 AM

## 2018-05-08 ENCOUNTER — HOSPITAL ENCOUNTER (OUTPATIENT)
Dept: HOSPITAL 45 - C.RAD | Age: 83
Discharge: HOME | End: 2018-05-08
Attending: PHYSICIAN ASSISTANT
Payer: OTHER GOVERNMENT

## 2018-05-08 DIAGNOSIS — Z90.2: Primary | ICD-10-CM

## 2018-05-08 NOTE — DIAGNOSTIC IMAGING REPORT
CHEST 2 VIEWS ROUTINE



HISTORY:  82 years-old Male Z90.2 acute shortness of breath with prior lobectomy



COMPARISON: Chest radiograph 2/1/2018, chest CT 3/19/2018



TECHNIQUE: PA and lateral views of the chest



FINDINGS: 

Postoperative changes about the bilateral lungs are redemonstrated. Cardiac

silhouette is within normal limits. Atherosclerosis of the aorta. Chronic

scarring/blunting of the bilateral costophrenic angles without large pleural

effusion, pneumothorax or overt pulmonary edema. Bones of the chest appear

grossly intact.



IMPRESSION: 

1. No acute processes of the chest.

2. Postsurgical changes of the bilateral lungs with chronic blunting of the

costophrenic angles with bibasilar scarring. 







The above report was generated using voice recognition software. It may contain

grammatical, syntax or spelling errors.







Electronically signed by:  Kareem Arenas M.D.

5/8/2018 11:50 AM



Dictated Date/Time:  5/8/2018 11:47 AM